# Patient Record
Sex: MALE | Race: WHITE | NOT HISPANIC OR LATINO | Employment: OTHER | URBAN - METROPOLITAN AREA
[De-identification: names, ages, dates, MRNs, and addresses within clinical notes are randomized per-mention and may not be internally consistent; named-entity substitution may affect disease eponyms.]

---

## 2023-06-22 LAB
NIL(NEG) CONTROL SPOT COUNT: NORMAL
PANEL A SPOT COUNT: 0
PANEL B SPOT COUNT: 0
POS CONTROL SPOT COUNT: NORMAL
T-SPOT. TB INTERPRETATION: NEGATIVE

## 2023-10-13 ENCOUNTER — PHARMACY VISIT (OUTPATIENT)
Dept: PHARMACY | Facility: CLINIC | Age: 58
End: 2023-10-13

## 2023-10-17 ENCOUNTER — OFFICE VISIT (OUTPATIENT)
Dept: ORTHOPEDIC SURGERY | Facility: CLINIC | Age: 58
End: 2023-10-17
Payer: COMMERCIAL

## 2023-10-17 ENCOUNTER — ANCILLARY PROCEDURE (OUTPATIENT)
Dept: RADIOLOGY | Facility: CLINIC | Age: 58
End: 2023-10-17
Payer: COMMERCIAL

## 2023-10-17 DIAGNOSIS — M25.511 RIGHT SHOULDER PAIN, UNSPECIFIED CHRONICITY: ICD-10-CM

## 2023-10-17 DIAGNOSIS — M25.511 RIGHT SHOULDER PAIN, UNSPECIFIED CHRONICITY: Primary | ICD-10-CM

## 2023-10-17 DIAGNOSIS — S46.011A TRAUMATIC TEAR OF RIGHT ROTATOR CUFF, UNSPECIFIED TEAR EXTENT, INITIAL ENCOUNTER: ICD-10-CM

## 2023-10-17 PROCEDURE — 73030 X-RAY EXAM OF SHOULDER: CPT | Mod: RT

## 2023-10-17 PROCEDURE — 2500000005 HC RX 250 GENERAL PHARMACY W/O HCPCS: Performed by: STUDENT IN AN ORGANIZED HEALTH CARE EDUCATION/TRAINING PROGRAM

## 2023-10-17 PROCEDURE — 2500000004 HC RX 250 GENERAL PHARMACY W/ HCPCS (ALT 636 FOR OP/ED): Performed by: STUDENT IN AN ORGANIZED HEALTH CARE EDUCATION/TRAINING PROGRAM

## 2023-10-17 PROCEDURE — 73030 X-RAY EXAM OF SHOULDER: CPT | Mod: RIGHT SIDE | Performed by: RADIOLOGY

## 2023-10-17 PROCEDURE — 20610 DRAIN/INJ JOINT/BURSA W/O US: CPT | Mod: RT | Performed by: STUDENT IN AN ORGANIZED HEALTH CARE EDUCATION/TRAINING PROGRAM

## 2023-10-17 PROCEDURE — 99204 OFFICE O/P NEW MOD 45 MIN: CPT | Performed by: STUDENT IN AN ORGANIZED HEALTH CARE EDUCATION/TRAINING PROGRAM

## 2023-10-17 PROCEDURE — 99214 OFFICE O/P EST MOD 30 MIN: CPT | Mod: 25 | Performed by: STUDENT IN AN ORGANIZED HEALTH CARE EDUCATION/TRAINING PROGRAM

## 2023-10-17 RX ORDER — LIDOCAINE HYDROCHLORIDE 10 MG/ML
5 INJECTION INFILTRATION; PERINEURAL
Status: COMPLETED | OUTPATIENT
Start: 2023-10-17 | End: 2023-10-17

## 2023-10-17 RX ORDER — TRIAMCINOLONE ACETONIDE 40 MG/ML
80 INJECTION, SUSPENSION INTRA-ARTICULAR; INTRAMUSCULAR
Status: COMPLETED | OUTPATIENT
Start: 2023-10-17 | End: 2023-10-17

## 2023-10-17 RX ADMIN — TRIAMCINOLONE ACETONIDE 80 MG: 40 INJECTION, SUSPENSION INTRA-ARTICULAR; INTRAMUSCULAR at 15:21

## 2023-10-17 RX ADMIN — LIDOCAINE HYDROCHLORIDE 5 ML: 10 INJECTION, SOLUTION INFILTRATION; PERINEURAL at 15:21

## 2023-10-17 NOTE — PROGRESS NOTES
CHIEF COMPLAINT: No chief complaint on file.    History: 58 y.o. male presents to the office today for evaluation of his right shoulder.  He works part-time as a teacher.  He otherwise is not too active.  He says that today he was walking his dog and fell right onto his back.  He had severe pain in the right shoulder since that time.  No prior history of right shoulder surgery.  Says the pain is primarily anterior lateral.  Having difficulty raising the arm.  Denies any numbness or tingling.  Pain is better at rest.  Rates the pain as 10 out of 10.    Past medical history, past surgical history, medications, allergies, family history, social history, and review of systems were reviewed today.    A 12 point review of systems was negative other than as stated in the HPI.    Past Medical History:   Diagnosis Date    Other specified health status     No pertinent past medical history        Not on File     Past Surgical History:   Procedure Laterality Date    OTHER SURGICAL HISTORY  08/09/2019    Hip replacement        No family history on file.     Social History     Socioeconomic History    Marital status:      Spouse name: Not on file    Number of children: Not on file    Years of education: Not on file    Highest education level: Not on file   Occupational History    Not on file   Tobacco Use    Smoking status: Not on file    Smokeless tobacco: Not on file   Substance and Sexual Activity    Alcohol use: Not on file    Drug use: Not on file    Sexual activity: Not on file   Other Topics Concern    Not on file   Social History Narrative    Not on file     Social Determinants of Health     Financial Resource Strain: Not on file   Food Insecurity: Not on file   Transportation Needs: Not on file   Physical Activity: Not on file   Stress: Not on file   Social Connections: Not on file   Intimate Partner Violence: Not on file   Housing Stability: Not on file        CURRENT MEDICATIONS:   Current Outpatient  Medications   Medication Sig Dispense Refill    risankizumab-rzaa (Skyrizi) 150 mg/mL pen injector pen INJECT 1 PEN UNDER THE SKIN ONCE EVERY 12 WEEKS AS MAINTENACE. 1 mL 3    risankizumab-rzaa (Skyrizi) 150 mg/mL pen injector pen INJECT 150MG (1 PEN) UNDER THE SKIN ONCE AT WEEK 0 AND INJECT 150MG (1 PEN) UNDER THE SKIN ONCE AT WEEK 4. 2 mL 0     No current facility-administered medications for this visit.       Physical Examination:  Well-appearing, appears stated age, pleasant and cooperative, appropriate mood and behavior. Height and weight reviewed. Alert and oriented x3.  Auditory function intact.  No acute distress.  Intact ocular function, MARY, EOMI. Breathing is unlabored . Full range of motion of the neck in flexion/extension and rotational movements. No significant areas of tenderness to palpation in the neck. There is no evidence of jugular venous distension. Skin appearance is normal without evidence of rash or other lesions. 2+ radial pulses bilaterally, fingers pink and wwp, good capillary refill, no pitting edema. No appreciable lymphadenopathy in bilateral upper extremities. SILT throughout both upper extremities, median/radial/ulnar/musculocutaneous/axillary nerve motor and sensory intact (except for abnormalities noted in focused musculoskeletal exam section below).     On exam of the bilateral upper extremities, limited range of motion of the right shoulder.  Active forward flexion 40 degrees, external rotation to neutral, internal rotation to the side.  Passively, I can assist him to forward flex to 150 degrees and externally rotate to 30 degrees.  Rotator cuff strength is 4+ out of 5, likely secondary to pain on the right side.  On the left side he has full strength rotator cuff strength testing.  His range of motion on the left is active forward flexion 150, external rotation to 30, internal rotation to T12.  Negative abdominal compression test bilaterally.    Imaging: Radiographs of the  right shoulder performed today.  Personally interpreted by myself.  Preserved glenohumeral joint space.  Preserved acromiohumeral interval.  No acute fractures noted.       Assessment: Right rotator cuff injury    Plan: Patient's symptoms, test result and exam are consistent with a diagnosis of rotator cuff injury.  On examination today, he deftly has weakness of rotator cuff strength testing, although with exertion it is near symmetric.  I do think that the exam is clouded a bit by the fact this happened this morning he still having significant pain.  We discussed the role of getting MRI to evaluate for rotator cuff tear, versus initiating conservative management.  The patient wanted to start with conservative management before getting an MRI.  And this is very reasonable.  Discussed the role of physical therapy and a cortisone injection.  Physical therapy prescription provided to the patient.  I do want to see him back in a few weeks to see how he is doing, and if he has continued weakness at that time we will get an MRI at that point.  All questions were answered.    Injection was performed, please see separate procedure note, patient tolerated well.       Patient ID: Víctor Hui is a 58 y.o. male.    L Inj/Asp: R subacromial bursa on 10/17/2023 3:21 PM  Indications: pain  Details: 22 G needle, posterior approach  Medications: 80 mg triamcinolone acetonide 40 mg/mL; 5 mL lidocaine 10 mg/mL (1 %)  Outcome: tolerated well, no immediate complications  Procedure, treatment alternatives, risks and benefits explained, specific risks discussed. Consent was given by the patient. Immediately prior to procedure a time out was called to verify the correct patient, procedure, equipment, support staff and site/side marked as required. Patient was prepped and draped in the usual sterile fashion.           Dragon software was used to dictate this note, please be aware that minor errors in transcription may be  present.    Ethan Mueller MD    Shoulder/Elbow Surgery  [facility]

## 2023-11-06 PROBLEM — E78.2 MIXED HYPERLIPIDEMIA: Status: ACTIVE | Noted: 2023-06-21

## 2023-11-06 PROBLEM — R05.3 CHRONIC COUGH: Status: ACTIVE | Noted: 2023-11-06

## 2023-11-06 PROBLEM — M16.10 PRIMARY LOCALIZED OSTEOARTHRITIS OF PELVIC REGION AND THIGH: Status: ACTIVE | Noted: 2017-07-11

## 2023-11-06 PROBLEM — M17.12 PRIMARY OSTEOARTHRITIS OF LEFT KNEE: Status: ACTIVE | Noted: 2023-11-06

## 2023-11-06 PROBLEM — R91.1 PULMONARY NODULE: Status: ACTIVE | Noted: 2023-06-21

## 2023-11-06 PROBLEM — L40.8 SEBORRHEIC PSORIASIS: Status: ACTIVE | Noted: 2023-06-21

## 2023-11-06 PROBLEM — L40.50 PSORIATIC ARTHRITIS (MULTI): Status: ACTIVE | Noted: 2023-06-21

## 2023-11-06 PROBLEM — E83.52 HYPERCALCEMIA: Status: ACTIVE | Noted: 2023-06-21

## 2023-11-06 PROBLEM — S92.353A CLOSED FRACTURE OF BASE OF FIFTH METATARSAL BONE: Status: ACTIVE | Noted: 2023-06-21

## 2023-11-06 PROBLEM — N52.9 IMPOTENCE OF ORGANIC ORIGIN: Status: ACTIVE | Noted: 2023-06-21

## 2023-11-06 PROBLEM — I10 BENIGN ESSENTIAL HYPERTENSION: Status: ACTIVE | Noted: 2023-06-21

## 2023-11-06 PROBLEM — E66.01 MORBIDLY OBESE (MULTI): Status: ACTIVE | Noted: 2017-10-26

## 2023-11-06 PROBLEM — M16.11 PRIMARY OSTEOARTHRITIS OF RIGHT HIP: Status: ACTIVE | Noted: 2017-08-28

## 2023-11-06 PROBLEM — F41.1 GAD (GENERALIZED ANXIETY DISORDER): Status: ACTIVE | Noted: 2023-06-21

## 2023-11-06 RX ORDER — KETOROLAC TROMETHAMINE 10 MG/1
10 TABLET, FILM COATED ORAL EVERY 6 HOURS PRN
COMMUNITY
Start: 2022-07-26 | End: 2023-11-27 | Stop reason: ALTCHOICE

## 2023-11-06 RX ORDER — FOLIC ACID 1 MG/1
TABLET ORAL
COMMUNITY
End: 2023-11-27 | Stop reason: ALTCHOICE

## 2023-11-06 RX ORDER — TRAMADOL HYDROCHLORIDE 50 MG/1
TABLET ORAL EVERY 6 HOURS PRN
COMMUNITY
Start: 2017-07-24 | End: 2023-11-27 | Stop reason: ALTCHOICE

## 2023-11-06 RX ORDER — NALOXONE HYDROCHLORIDE 4 MG/.1ML
SPRAY NASAL
COMMUNITY
Start: 2020-06-29 | End: 2023-11-27 | Stop reason: ALTCHOICE

## 2023-11-06 RX ORDER — ACETAMINOPHEN 500 MG
TABLET ORAL
COMMUNITY
End: 2023-11-27 | Stop reason: ALTCHOICE

## 2023-11-06 RX ORDER — DULOXETIN HYDROCHLORIDE 30 MG/1
CAPSULE, DELAYED RELEASE ORAL
COMMUNITY
Start: 2019-11-01 | End: 2023-11-27 | Stop reason: ALTCHOICE

## 2023-11-06 RX ORDER — ASPIRIN 81 MG/1
81 TABLET ORAL 2 TIMES DAILY
COMMUNITY
Start: 2020-06-29 | End: 2023-11-27 | Stop reason: ALTCHOICE

## 2023-11-06 RX ORDER — GABAPENTIN 300 MG/1
300 CAPSULE ORAL NIGHTLY
COMMUNITY
Start: 2022-12-27 | End: 2023-11-27 | Stop reason: ALTCHOICE

## 2023-11-06 RX ORDER — FLUOXETINE HYDROCHLORIDE 20 MG/1
CAPSULE ORAL
COMMUNITY
End: 2023-11-27 | Stop reason: ALTCHOICE

## 2023-11-06 RX ORDER — SERTRALINE HYDROCHLORIDE 50 MG/1
2 TABLET, FILM COATED ORAL
COMMUNITY
Start: 2022-12-13 | End: 2023-11-27 | Stop reason: ALTCHOICE

## 2023-11-06 RX ORDER — METOPROLOL TARTRATE 75 MG/1
TABLET, FILM COATED ORAL
COMMUNITY
End: 2023-11-27 | Stop reason: ALTCHOICE

## 2023-11-06 RX ORDER — METOPROLOL SUCCINATE 25 MG/1
25 TABLET, EXTENDED RELEASE ORAL EVERY MORNING
COMMUNITY

## 2023-11-06 RX ORDER — QUETIAPINE FUMARATE 25 MG/1
25 TABLET, FILM COATED ORAL NIGHTLY
COMMUNITY
End: 2023-11-27 | Stop reason: ALTCHOICE

## 2023-11-06 RX ORDER — LISINOPRIL AND HYDROCHLOROTHIAZIDE 10; 12.5 MG/1; MG/1
1 TABLET ORAL EVERY MORNING
COMMUNITY
Start: 2023-06-21 | End: 2024-07-13

## 2023-11-06 RX ORDER — APREMILAST 30 MG/1
TABLET, FILM COATED ORAL
COMMUNITY
End: 2023-11-27 | Stop reason: ALTCHOICE

## 2023-11-06 RX ORDER — FLUTICASONE PROPIONATE 50 MCG
1-2 SPRAY, SUSPENSION (ML) NASAL DAILY
COMMUNITY
Start: 2022-06-15 | End: 2023-11-27 | Stop reason: ALTCHOICE

## 2023-11-06 NOTE — PROGRESS NOTES
CHIEF COMPLAINT:   Chief Complaint   Patient presents with    Right Shoulder - Follow-up     History: 58 y.o. male presents to the office today for follow-up of his right shoulder.  We last saw him about 1 month ago.  At that time, he had a traumatic injury to the right shoulder and we were concerned about a possible rotator cuff tear.  The patient opted to try conservative management first in the form of a cortisone injection and physical therapy.  He says he injected and only helped for a few days.  Has been taking over-the-counter medications as needed for the pain.  His family member is a physical therapist and he has tried therapy, as well as physician directed exercises, but continues have significant pain and weakness of the right shoulder.  Denies any numbness or tingling.  Prior to the injury, his shoulder was functioning at 100%.  He is in 10 out of 10 pain today.    Past medical history, past surgical history, medications, allergies, family history, social history, and review of systems were reviewed today.    A 12 point review of systems was negative other than as stated in the HPI.    Past Medical History:   Diagnosis Date    Other specified health status     No pertinent past medical history        No Known Allergies     Past Surgical History:   Procedure Laterality Date    OTHER SURGICAL HISTORY  08/09/2019    Hip replacement        No family history on file.     Social History     Socioeconomic History    Marital status:      Spouse name: Not on file    Number of children: Not on file    Years of education: Not on file    Highest education level: Not on file   Occupational History    Not on file   Tobacco Use    Smoking status: Never    Smokeless tobacco: Never   Substance and Sexual Activity    Alcohol use: Not on file    Drug use: Not on file    Sexual activity: Not on file   Other Topics Concern    Not on file   Social History Narrative    Not on file     Social Determinants of Health      Financial Resource Strain: Not on file   Food Insecurity: Not on file   Transportation Needs: Not on file   Physical Activity: Not on file   Stress: Not on file   Social Connections: Not on file   Intimate Partner Violence: Not on file   Housing Stability: Not on file        CURRENT MEDICATIONS:   Current Outpatient Medications   Medication Sig Dispense Refill    apremilast (Otezla) 30 mg tablet       aspirin 81 mg EC tablet Take 1 tablet (81 mg) by mouth twice a day.      cholecalciferol (Vitamin D3) 50 mcg (2,000 unit) capsule Take by mouth.      DULoxetine (Cymbalta) 30 mg DR capsule TAKE 1 CAPSULE BY MOUTH EVERY DAY for 1 week  then take 2 capsules daily thereafter      FLUoxetine (PROzac) 20 mg capsule       fluticasone (Flonase) 50 mcg/actuation nasal spray Administer 1-2 sprays into each nostril once daily.      folic acid (Folvite) 1 mg tablet Take by mouth.      gabapentin (Neurontin) 300 mg capsule Take 1 capsule (300 mg) by mouth once daily at bedtime.      ketorolac (Toradol) 10 mg tablet Take 1 tablet (10 mg) by mouth every 6 hours if needed.      lisinopriL-hydrochlorothiazide 10-12.5 mg tablet Take 1 tablet by mouth once daily in the morning.      metoprolol succinate XL (Toprol-XL) 25 mg 24 hr tablet Take 1 tablet (25 mg) by mouth once daily in the morning.      metoprolol tartrate (Lopressor) 75 mg tablet Take by mouth.      naloxone (Narcan) 4 mg/0.1 mL nasal spray [The details of the medication are not available because there are pending changes by a home health clinician.]      QUEtiapine (SEROquel) 25 mg tablet Take 1 tablet (25 mg) by mouth once daily at bedtime.      risankizumab-rzaa (Skyrizi) 150 mg/mL pen injector pen INJECT 1 PEN UNDER THE SKIN ONCE EVERY 12 WEEKS AS MAINTENACE. 1 mL 3    risankizumab-rzaa (Skyrizi) 150 mg/mL pen injector pen INJECT 150MG (1 PEN) UNDER THE SKIN ONCE AT WEEK 0 AND INJECT 150MG (1 PEN) UNDER THE SKIN ONCE AT WEEK 4. 2 mL 0    sertraline (Zoloft) 50 mg  tablet Take 2 tablets (100 mg) by mouth once daily.      traMADol (Ultram) 50 mg tablet every 6 hours if needed.       No current facility-administered medications for this visit.       Physical Examination:      6/15/2022     8:29 AM 12/20/2022     8:24 AM 12/20/2022     8:30 AM   Vitals   Systolic 126  136   Diastolic 77  86   Heart Rate 77  68   Temp 35.8 °C (96.4 °F)  35.7 °C (96.2 °F)   Resp 16     Height (in) 1.829 m (6') 1.829 m (6')    Weight (lb) 280 274    BMI 37.97 kg/m2 37.16 kg/m2    BSA (m2) 2.54 m2 2.51 m2       There is no height or weight on file to calculate BMI.    Well-appearing, appears stated age, pleasant and cooperative, appropriate mood and behavior. Height and weight reviewed. Alert and oriented x3.  Auditory function intact.  No acute distress.  Intact ocular function, MARY, EOMI. Breathing is unlabored .  There is no evidence of jugular venous distension. Skin appearance is normal without evidence of rash or other lesions. 2+ radial pulses bilaterally, fingers pink and wwp, good capillary refill, no pitting edema. No appreciable lymphadenopathy in bilateral upper extremities. SILT throughout both upper extremities, median/radial/ulnar/musculocutaneous/axillary nerve motor and sensory intact (except for abnormalities noted in focused musculoskeletal exam section below).     Neck exam: Full range of motion of the neck in flexion/extension and rotational movements. No significant areas of tenderness to palpation in the neck.    On exam of bilateral upper extremities, very limited range of motion of the right shoulder today.  Active forward flexion to 60, external rotation of 30, internal rotation to the side.  On the left is active forward flexion to 150, external rotation 70, internal rotation T12.  Passively, I can achieve full range of motion of the right shoulder, but is extremely painful.  Significant weakness of rotator cuff strength testing on the right side, 4 out of 5 strength with  resisted supraspinatus and resisted external rotation testing, compared to full strength on the contralateral side.  Severe pain with Neer and Richardson maneuvers of the right shoulder.    Assessment: Concern for traumatic right rotator cuff tear    Plan: 58-year-old gentleman with an acute injury to the right shoulder about 4 weeks ago, now with severe pain in the shoulder as well as weakness with rotator cuff strength testing.  We have attempted conservative management in the form of cortisone injection, and therapy, but patient continues to have significant weakness.  Importantly, he has significant weakness of rotator cuff strength testing today.  I am concerned that he has a traumatic rotator cuff tear.  Therefore, due to this warrants an MRI to evaluate for the integrity of the rotator cuff.  Based on this, we will make a decision on the next plans.  MRI was ordered today.  We will see him back in a week to review this.  All questions were answered.    Dragon software was used to dictate this note, please be aware that minor errors in transcription may be present.    Ethan Mueller MD    Shoulder/Elbow Surgery  [facility]

## 2023-11-07 ENCOUNTER — OFFICE VISIT (OUTPATIENT)
Dept: ORTHOPEDIC SURGERY | Facility: CLINIC | Age: 58
End: 2023-11-07
Payer: COMMERCIAL

## 2023-11-07 ENCOUNTER — ANCILLARY PROCEDURE (OUTPATIENT)
Dept: RADIOLOGY | Facility: CLINIC | Age: 58
End: 2023-11-07
Payer: COMMERCIAL

## 2023-11-07 DIAGNOSIS — M25.511 RIGHT SHOULDER PAIN, UNSPECIFIED CHRONICITY: ICD-10-CM

## 2023-11-07 DIAGNOSIS — S46.011A TRAUMATIC TEAR OF RIGHT ROTATOR CUFF, UNSPECIFIED TEAR EXTENT, INITIAL ENCOUNTER: ICD-10-CM

## 2023-11-07 DIAGNOSIS — S46.011A TRAUMATIC TEAR OF RIGHT ROTATOR CUFF, UNSPECIFIED TEAR EXTENT, INITIAL ENCOUNTER: Primary | ICD-10-CM

## 2023-11-07 PROCEDURE — 73221 MRI JOINT UPR EXTREM W/O DYE: CPT | Mod: RT

## 2023-11-07 PROCEDURE — 99213 OFFICE O/P EST LOW 20 MIN: CPT | Performed by: STUDENT IN AN ORGANIZED HEALTH CARE EDUCATION/TRAINING PROGRAM

## 2023-11-07 PROCEDURE — 73221 MRI JOINT UPR EXTREM W/O DYE: CPT | Mod: RIGHT SIDE | Performed by: RADIOLOGY

## 2023-11-07 PROCEDURE — 1036F TOBACCO NON-USER: CPT | Performed by: STUDENT IN AN ORGANIZED HEALTH CARE EDUCATION/TRAINING PROGRAM

## 2023-11-09 ENCOUNTER — PHARMACY VISIT (OUTPATIENT)
Dept: PHARMACY | Facility: CLINIC | Age: 58
End: 2023-11-09
Payer: COMMERCIAL

## 2023-11-09 PROCEDURE — RXMED WILLOW AMBULATORY MEDICATION CHARGE

## 2023-11-13 NOTE — PROGRESS NOTES
CHIEF COMPLAINT: No chief complaint on file.    History: 58 y.o. male presents to the office today for evaluation of his right shoulder.  Just to review, patient is right-hand dominant.  He works part-time as a teacher.  He denies specific, traumatic injury while he was walking his dog and fell onto the shoulder about 1 month ago.  He says prior to this, the shoulder was functioning at 100%.  Since then, we have tried an injection and physical therapy, he has been unable to regain range of motion in the right shoulder and there is persistent pain.  Difficulty with overhead activities.  Still has significant pain in the shoulder has primarily anterior lateral.  We are concerned for a traumatic rotator cuff tear, therefore got an MRI.  Symptoms are similar to when I last saw him last week.    Past medical history, past surgical history, medications, allergies, family history, social history, and review of systems were reviewed today.    A 12 point review of systems was negative other than as stated in the HPI.    Past Medical History:   Diagnosis Date    Other specified health status     No pertinent past medical history        No Known Allergies     Past Surgical History:   Procedure Laterality Date    OTHER SURGICAL HISTORY  08/09/2019    Hip replacement        No family history on file.     Social History     Socioeconomic History    Marital status:      Spouse name: Not on file    Number of children: Not on file    Years of education: Not on file    Highest education level: Not on file   Occupational History    Not on file   Tobacco Use    Smoking status: Never    Smokeless tobacco: Never   Substance and Sexual Activity    Alcohol use: Not on file    Drug use: Not on file    Sexual activity: Not on file   Other Topics Concern    Not on file   Social History Narrative    Not on file     Social Determinants of Health     Financial Resource Strain: Not on file   Food Insecurity: Not on file   Transportation  Needs: Not on file   Physical Activity: Not on file   Stress: Not on file   Social Connections: Not on file   Intimate Partner Violence: Not on file   Housing Stability: Not on file        CURRENT MEDICATIONS:   Current Outpatient Medications   Medication Sig Dispense Refill    apremilast (Otezla) 30 mg tablet       aspirin 81 mg EC tablet Take 1 tablet (81 mg) by mouth twice a day.      cholecalciferol (Vitamin D3) 50 mcg (2,000 unit) capsule Take by mouth.      DULoxetine (Cymbalta) 30 mg DR capsule TAKE 1 CAPSULE BY MOUTH EVERY DAY for 1 week  then take 2 capsules daily thereafter      FLUoxetine (PROzac) 20 mg capsule       fluticasone (Flonase) 50 mcg/actuation nasal spray Administer 1-2 sprays into each nostril once daily.      folic acid (Folvite) 1 mg tablet Take by mouth.      gabapentin (Neurontin) 300 mg capsule Take 1 capsule (300 mg) by mouth once daily at bedtime.      ketorolac (Toradol) 10 mg tablet Take 1 tablet (10 mg) by mouth every 6 hours if needed.      lisinopriL-hydrochlorothiazide 10-12.5 mg tablet Take 1 tablet by mouth once daily in the morning.      metoprolol succinate XL (Toprol-XL) 25 mg 24 hr tablet Take 1 tablet (25 mg) by mouth once daily in the morning.      metoprolol tartrate (Lopressor) 75 mg tablet Take by mouth.      naloxone (Narcan) 4 mg/0.1 mL nasal spray [The details of the medication are not available because there are pending changes by a home health clinician.]      QUEtiapine (SEROquel) 25 mg tablet Take 1 tablet (25 mg) by mouth once daily at bedtime.      risankizumab-rzaa (Skyrizi) 150 mg/mL pen injector pen INJECT 1 PEN UNDER THE SKIN ONCE EVERY 12 WEEKS AS MAINTENACE. 1 mL 3    risankizumab-rzaa (Skyrizi) 150 mg/mL pen injector pen INJECT 150MG (1 PEN) UNDER THE SKIN ONCE AT WEEK 0 AND INJECT 150MG (1 PEN) UNDER THE SKIN ONCE AT WEEK 4. 2 mL 0    sertraline (Zoloft) 50 mg tablet Take 2 tablets (100 mg) by mouth once daily.      traMADol (Ultram) 50 mg tablet every  6 hours if needed.       No current facility-administered medications for this visit.       Physical Examination:      6/15/2022     8:29 AM 12/20/2022     8:24 AM 12/20/2022     8:30 AM   Vitals   Systolic 126  136   Diastolic 77  86   Heart Rate 77  68   Temp 35.8 °C (96.4 °F)  35.7 °C (96.2 °F)   Resp 16     Height (in) 1.829 m (6') 1.829 m (6')    Weight (lb) 280 274    BMI 37.97 kg/m2 37.16 kg/m2    BSA (m2) 2.54 m2 2.51 m2       There is no height or weight on file to calculate BMI.    Well-appearing, appears stated age, pleasant and cooperative, appropriate mood and behavior. Height and weight reviewed. Alert and oriented x3.  Auditory function intact.  No acute distress.  Intact ocular function, MARY, EOMI. Breathing is unlabored .  There is no evidence of jugular venous distension. Skin appearance is normal without evidence of rash or other lesions. 2+ radial pulses bilaterally, fingers pink and wwp, good capillary refill, no pitting edema. No appreciable lymphadenopathy in bilateral upper extremities. SILT throughout both upper extremities, median/radial/ulnar/musculocutaneous/axillary nerve motor and sensory intact (except for abnormalities noted in focused musculoskeletal exam section below).     Neck exam: Full range of motion of the neck in flexion/extension and rotational movements. No significant areas of tenderness to palpation in the neck.    Shoulder exam:    Right Shoulder  Inspection: Skin intact.   TTP: Diffuse    Range of motion  FF: Active to 60, passive to 150  ER: 30  IR behind back: Side    Strength (out of 5)  Dagoberto's: 4  Resisted ER: 4+    Special tests  Abdominal compression test: Negative  Neer's: Severe pain  Hawkin's: + Pain  ER lag: Negative    Left Shoulder  Inspection: Skin intact.   TTP: None    Range of motion (active and passive ROM was equal)  FF: 150  ER: 70  IR behind back: T12    Strength (out of 5)  Dagoberto's: 5  Resisted ER: 5    Special tests  Abdominal compression test:  Negative    Imaging: MRI of the right shoulder was reviewed today.  First interpreted by myself.  There is a full-thickness tear involving the supraspinatus and anterior infraspinatus.  The subscapularis and teres minor are intact.  There is mild tendinopathy around the biceps tendon.  There is also degenerative tearing of the anterior as well as superior labrum and biceps labral complex.  Glenohumeral joint cartilage appears intact.  There is edema within the joint is defined likely acute tear.  Rotator cuff musculature is intact without significant atrophy or fatty infiltration.    Assessment: Traumatic acute right rotator cuff tear    Plan: Long discussion with the patient again about treatment options and diagnosis.  The patient had a specific, traumatic event 1 month ago and has been having issues with his right shoulder in terms of pain and dysfunction since that time.  He has severe pain in significant rotator cuff weakness on physical exam.  MRI shows a full-thickness rotator cuff tear involving the supraspinatus and anterior infraspinatus.  There are also some degenerative changes of the shoulder.  We have tried injections as well as conservative management including physical therapy, active modification, over-the-counter medication, without success.  I discussed that in the setting of an acute, traumatic rotator cuff injury with the patient was 100% prior to the injury, we often talk about operative fixation in order to give the patient the best chance of optimizing his range of motion and restoring full function of the shoulder.  At this point the patient has failed conservative management, and would like to proceed with surgery.  I think it is very reasonable.    The patient has an acute, traumatic rotator cuff tear.  The patient has failed conservative management, including therapy and injections.  At this point, the patient is a candidate for surgery.  Patient is in agreement with this.  Risks and  recovery after surgery were discussed.  The proposed procedure will be an arthroscopic rotator cuff repair, extensive debridement, and possible biceps tenodesis depending on the state of the biceps.  Risks of surgery include bleeding, infection, failure of the tendon to heal, neurovascular injury, persistent pain, failure of the repair, and possible need for further surgery.  We discussed that there are certain risk factors for the rotator cuff tendon not healing to bone, including age over 65, large and massive rotator cuff tears, smoking, as well as others. All surgeries that are performed under anesthesia also have the risks of DVTs, pulmonary embolism, potentially death. Per anesthesia's evaluation, the patient will have a nerve block, the risks of which the anesthesia team will discuss with the patient.   Patient voiced understanding of these risks and wished to proceed.  Postoperative recovery involves being in a sling for 6 weeks without weightbearing, and then gradual therapy to strengthen the arm.  We did discuss that rotator cuff surgery has an extensive recovery, usually around 5 to 6 months until they are getting their strength back and probably feeling around 85% at that time. It is really 1 year until their arm is feeling completely better and at 100%.    The patient will need PATs which will also include a CBC, BMP, PT/INR and a full work up by the PAT team as well as anesthesia evaluation.  My office will work to get this scheduled. I will see them back 2 weeks after surgery.    Patient was prescribed a shoulder sling for postoperative care. The patient will have weakness, instability and/or deformity of their (right/left) arm which requires stabilization from this orthosis to improve their function after surgery. Verbal and written instructions for the use, wear schedule, cleaning and application of this item were given. Patient was instructed that should the brace result in increased pain,  decreased sensation, increased swelling, or an overall worsening of their medical condition, to please contact our office immediately. Orthotic management and training was provided for skin care, modifications due to healing tissues, edema changes, interruption in skin integrity, and safety precautions with the orthosis.      Dragon software was used to dictate this note, please be aware that minor errors in transcription may be present.  Ethan Mueller MD    Shoulder/Elbow Surgery  [facility]

## 2023-11-14 ENCOUNTER — PREP FOR PROCEDURE (OUTPATIENT)
Dept: ORTHOPEDIC SURGERY | Facility: CLINIC | Age: 58
End: 2023-11-14

## 2023-11-14 ENCOUNTER — OFFICE VISIT (OUTPATIENT)
Dept: ORTHOPEDIC SURGERY | Facility: CLINIC | Age: 58
End: 2023-11-14
Payer: COMMERCIAL

## 2023-11-14 DIAGNOSIS — S46.011A TRAUMATIC TEAR OF RIGHT ROTATOR CUFF, UNSPECIFIED TEAR EXTENT, INITIAL ENCOUNTER: Primary | ICD-10-CM

## 2023-11-14 PROCEDURE — 99214 OFFICE O/P EST MOD 30 MIN: CPT | Performed by: STUDENT IN AN ORGANIZED HEALTH CARE EDUCATION/TRAINING PROGRAM

## 2023-11-14 PROCEDURE — 1036F TOBACCO NON-USER: CPT | Performed by: STUDENT IN AN ORGANIZED HEALTH CARE EDUCATION/TRAINING PROGRAM

## 2023-11-14 PROCEDURE — L3670 SO ACRO/CLAV CAN WEB PRE OTS: HCPCS | Performed by: STUDENT IN AN ORGANIZED HEALTH CARE EDUCATION/TRAINING PROGRAM

## 2023-11-16 ENCOUNTER — SPECIALTY PHARMACY (OUTPATIENT)
Dept: PHARMACY | Facility: CLINIC | Age: 58
End: 2023-11-16

## 2023-11-27 ENCOUNTER — PRE-ADMISSION TESTING (OUTPATIENT)
Dept: PREADMISSION TESTING | Facility: HOSPITAL | Age: 58
End: 2023-11-27
Payer: COMMERCIAL

## 2023-11-27 VITALS
BODY MASS INDEX: 36.85 KG/M2 | OXYGEN SATURATION: 98 % | HEART RATE: 98 BPM | HEIGHT: 72 IN | DIASTOLIC BLOOD PRESSURE: 77 MMHG | RESPIRATION RATE: 18 BRPM | WEIGHT: 272.05 LBS | TEMPERATURE: 96.5 F | SYSTOLIC BLOOD PRESSURE: 126 MMHG

## 2023-11-27 DIAGNOSIS — Z01.818 PREOPERATIVE TESTING: Primary | ICD-10-CM

## 2023-11-27 LAB
ALBUMIN SERPL BCP-MCNC: 4.5 G/DL (ref 3.4–5)
ALP SERPL-CCNC: 57 U/L (ref 33–136)
ALT SERPL W P-5'-P-CCNC: 57 U/L (ref 10–52)
ANION GAP SERPL CALC-SCNC: 14 MMOL/L (ref 10–20)
AST SERPL W P-5'-P-CCNC: 49 U/L (ref 9–39)
BILIRUB SERPL-MCNC: 0.9 MG/DL (ref 0–1.2)
BUN SERPL-MCNC: 16 MG/DL (ref 6–23)
CALCIUM SERPL-MCNC: 10.1 MG/DL (ref 8.6–10.3)
CHLORIDE SERPL-SCNC: 86 MMOL/L (ref 98–107)
CO2 SERPL-SCNC: 30 MMOL/L (ref 21–32)
CREAT SERPL-MCNC: 0.97 MG/DL (ref 0.5–1.3)
ERYTHROCYTE [DISTWIDTH] IN BLOOD BY AUTOMATED COUNT: 12.9 % (ref 11.5–14.5)
GFR SERPL CREATININE-BSD FRML MDRD: 89 ML/MIN/1.73M*2
GLUCOSE SERPL-MCNC: 115 MG/DL (ref 74–99)
HCT VFR BLD AUTO: 37.9 % (ref 41–52)
HGB BLD-MCNC: 13.6 G/DL (ref 13.5–17.5)
MCH RBC QN AUTO: 36.1 PG (ref 26–34)
MCHC RBC AUTO-ENTMCNC: 35.9 G/DL (ref 32–36)
MCV RBC AUTO: 101 FL (ref 80–100)
NRBC BLD-RTO: ABNORMAL /100{WBCS}
PLATELET # BLD AUTO: 226 X10*3/UL (ref 150–450)
POTASSIUM SERPL-SCNC: 4.3 MMOL/L (ref 3.5–5.3)
PROT SERPL-MCNC: 7.3 G/DL (ref 6.4–8.2)
RBC # BLD AUTO: 3.77 X10*6/UL (ref 4.5–5.9)
SODIUM SERPL-SCNC: 126 MMOL/L (ref 136–145)
WBC # BLD AUTO: 6.7 X10*3/UL (ref 4.4–11.3)

## 2023-11-27 PROCEDURE — 93005 ELECTROCARDIOGRAM TRACING: CPT

## 2023-11-27 PROCEDURE — 99204 OFFICE O/P NEW MOD 45 MIN: CPT

## 2023-11-27 PROCEDURE — 36415 COLL VENOUS BLD VENIPUNCTURE: CPT

## 2023-11-27 PROCEDURE — 80053 COMPREHEN METABOLIC PANEL: CPT

## 2023-11-27 PROCEDURE — 85027 COMPLETE CBC AUTOMATED: CPT

## 2023-11-27 PROCEDURE — 93010 ELECTROCARDIOGRAM REPORT: CPT | Performed by: INTERNAL MEDICINE

## 2023-11-27 RX ORDER — NAPROXEN 250 MG/1
250 TABLET ORAL 2 TIMES DAILY PRN
COMMUNITY
End: 2023-11-30 | Stop reason: HOSPADM

## 2023-11-27 ASSESSMENT — CHADS2 SCORE
DIABETES: NO
CHADS2 SCORE: 1
AGE GREATER THAN OR EQUAL TO 75: NO
CHF: NO
PRIOR STROKE OR TIA OR THROMBOEMBOLISM: NO
HYPERTENSION: YES

## 2023-11-27 ASSESSMENT — DUKE ACTIVITY SCORE INDEX (DASI)
CAN YOU DO MODERATE WORK AROUND THE HOUSE LIKE VACUUMING, SWEEPING FLOORS OR CARRYING GROCERIES: YES
CAN YOU TAKE CARE OF YOURSELF (EAT, DRESS, BATHE, OR USE TOILET): YES
TOTAL_SCORE: 34.7
DASI METS SCORE: 7
CAN YOU PARTICIPATE IN STRENOUS SPORTS LIKE SWIMMING, SINGLES TENNIS, FOOTBALL, BASKETBALL, OR SKIING: NO
CAN YOU WALK A BLOCK OR TWO ON LEVEL GROUND: YES
CAN YOU DO YARD WORK LIKE RAKING LEAVES, WEEDING OR PUSHING A MOWER: YES
CAN YOU WALK INDOORS, SUCH AS AROUND YOUR HOUSE: YES
CAN YOU PARTICIPATE IN MODERATE RECREATIONAL ACTIVITIES LIKE GOLF, BOWLING, DANCING, DOUBLES TENNIS OR THROWING A BASEBALL OR FOOTBALL: YES
CAN YOU HAVE SEXUAL RELATIONS: YES
CAN YOU CLIMB A FLIGHT OF STAIRS OR WALK UP A HILL: YES
CAN YOU DO HEAVY WORK AROUND THE HOUSE LIKE SCRUBBING FLOORS OR LIFTING AND MOVING HEAVY FURNITURE: NO
CAN YOU DO LIGHT WORK AROUND THE HOUSE LIKE DUSTING OR WASHING DISHES: YES
CAN YOU RUN A SHORT DISTANCE: NO

## 2023-11-27 ASSESSMENT — PAIN SCALES - GENERAL: PAINLEVEL_OUTOF10: 4

## 2023-11-27 ASSESSMENT — PAIN DESCRIPTION - DESCRIPTORS: DESCRIPTORS: ACHING

## 2023-11-27 ASSESSMENT — PAIN - FUNCTIONAL ASSESSMENT: PAIN_FUNCTIONAL_ASSESSMENT: 0-10

## 2023-11-27 NOTE — CPM/PAT H&P
CPM/PAT Evaluation       Name: Víctor Hui (Víctor Hui)  /Age: 1965/58 y.o.     In-Person       Chief Complaint: Traumatic tear of rotator cuff-right    HPI  Patient is a 59 y/o alert and oriented male coming in for PAT for a Right shoulder arthroscopy and RCR scheduled on 2023 with Dr Mueller. He reports right shoulder pain that he rates at a 4/10 and worsens on movement/lifting. Patient denies Cx pain, SOB, CASTANEDA, and NVDC. Patient also denies Hx DVT/PE. Current medications were reviewed and a presurgical medication schedule was provided. He has no questions at this time.    NO PERSONAL REPORTS OF REACTIONS TO ANESTHESIA  NO PERSONAL REPORTS OF FAMILY HISTORY OF REACTIONS TO ANESTHESIA  NO PERSONAL REPORTS OF METAL, NICKEL, OR SHELLFISH ALLERGY  NONSMOKER-NEVER SMOKED  +ETOH REPORTS 2-3 GLASSES OF WINE A WEEK/NO DRUGS    Past Medical History:   Diagnosis Date    Anxiety     Arthritis     Hypertension     Other specified health status     No pertinent past medical history    Psoriatic arthritis (CMS/HCC)      Past Surgical History:   Procedure Laterality Date    COLONOSCOPY      HIP ARTHROPLASTY      OTHER SURGICAL HISTORY  2019    Hip replacement     No Known Allergies    Medication Documentation Review Audit       Reviewed by Ratna Nails RN (Registered Nurse) on 23 at 0718      Medication Order Taking? Sig Documenting Provider Last Dose Status      Discontinued 23 0712     Discontinued 23 0716     Discontinued 23 0713     Discontinued 23 0713      Discontinued 23 0713     Discontinued 23 0713     Discontinued 23 0713     Discontinued 23 0714     Discontinued 23 0714   lisinopriL-hydrochlorothiazide 10-12.5 mg tablet [id number] Yes Take 1 tablet by mouth once daily in the morning. Historical Provider, MD 2023 Active   metoprolol succinate XL (Toprol-XL) 25 mg 24 hr tablet [id number] Yes Take 1 tablet (25 mg) by mouth once  daily in the morning. Historical Provider, MD 11/27/2023 Active     Discontinued 11/27/23 0714     Discontinued 11/27/23 0714   naproxen (Naprosyn) 250 mg tablet [id number] Yes Take 1 tablet (250 mg) by mouth 2 times a day as needed for mild pain (1 - 3). Historical Provider, MD Past Week Active     Discontinued 11/27/23 0714   risankizumab-rzaa (Skyrizi) 150 mg/mL pen injector pen [id number]  INJECT 150 MG (1 PEN) UNDER THE SKIN ONCE EVERY 12 WEEKS AS MAINTENANCE Jona Hsu MD  Active     Discontinued 11/27/23 0714     Discontinued 11/27/23 0715     Discontinued 11/27/23 0715                  Review of Systems   Constitutional: Negative for chills, decreased appetite, diaphoresis, fever and malaise/fatigue.   Eyes:  Negative for blurred vision and double vision.   Cardiovascular:  Negative for chest pain, claudication, cyanosis, dyspnea on exertion, irregular heartbeat, leg swelling, near-syncope and palpitations.   Respiratory:  Negative for cough, hemoptysis, shortness of breath and wheezing.    Endocrine: Negative for cold intolerance, heat intolerance, polydipsia, polyphagia and polyuria.   Gastrointestinal:  Negative for abdominal pain, constipation, diarrhea, dysphagia, nausea and vomiting.   Genitourinary:  Negative for bladder incontinence, dysuria, hematuria, incomplete emptying, nocturia, pelvic pain and urgency.   Neurological:  Negative for headaches, light-headedness, paresthesias, sensory change and weakness.   Psychiatric/Behavioral:  Negative for altered mental status.       Vitals and nursing note reviewed.   Constitutional:       Appearance: Normal appearance. He is obese.   HENT:      Head: Normocephalic and atraumatic.      Mouth/Throat:      Mouth: Mucous membranes are moist.      Pharynx: Oropharynx is clear.   Eyes:      Extraocular Movements: Extraocular movements intact.      Conjunctiva/sclera: Conjunctivae normal.      Pupils: Pupils are equal, round, and reactive to light.    Cardiovascular:      Rate and Rhythm: Normal rate and regular rhythm.      Pulses: Normal pulses.      Heart sounds: Normal heart sounds.   Pulmonary:      Effort: Pulmonary effort is normal.      Breath sounds: Normal breath sounds.   Abdominal:      General: Abdomen is flat. Bowel sounds are normal.      Palpations: Abdomen is soft.   Musculoskeletal:      Cervical back: Normal range of motion and neck supple.   Skin:     General: Skin is warm and dry.      Capillary Refill: Capillary refill takes less than 2 seconds.   Neurological:      General: No focal deficit present.      Mental Status: He is alert and oriented to person, place, and time. Mental status is at baseline.   Psychiatric:         Mood and Affect: Mood normal.         Behavior: Behavior normal.         Thought Content: Thought content normal.         Judgment: Judgment normal.     PAT AIRWAY:   Airway:     Mallampati::  IIII    TM distance::  >3 FB    Neck ROM::  Full     Visit Vitals  /77   Pulse 98   Temp 35.8 °C (96.5 °F) (Temporal)   Resp 18   Ht 1.829 m (6')   Wt 123 kg (272 lb 0.8 oz)   SpO2 98%   BMI 36.90 kg/m²   Smoking Status Never   BSA 2.5 m²      EKG COMPLETED IN PAT. NSR WITH NONSPECIFIC ST AND T WAVE ABNORMALITY. ANTEROLATERAL T WAVE INVERSIONS PREVIOUSLY DEMONSTRATED ON LAST EKG 7/2022-SEE EPIC. ASYMPTOMATIC WITH GOOD FUNCTIONAL STATUS.     DASI Risk Score      Flowsheet Row Most Recent Value   DASI SCORE 34.7   METS Score (Will be calculated only when all the questions are answered) 7          Caprini DVT Assessment      Flowsheet Row Most Recent Value   DVT Score 7   Current Status Major surgery planned, including arthroscopic and laproscopic (1-2 hours)   Age 60-75 years   BMI 31-40 (Obesity)          Modified Frailty Index      Flowsheet Row Most Recent Value   Modified Frailty Index Calculator .0909          CHADS2 Stroke Risk  Current as of 7 minutes ago        N/A 3 - 100%: High Risk   2 - 3%: Medium Risk   0 - 2%:  Low Risk     Last Change: N/A          This score determines the patient's risk of having a stroke if the patient has atrial fibrillation.        This score is not applicable to this patient. Components are not calculated.          Revised Cardiac Risk Index      Flowsheet Row Most Recent Value   Revised Cardiac Risk Calculator 0          Apfel Simplified Score    No data to display       Risk Analysis Index Results This Encounter    No data found in the last 1 encounters.       Stop Bang Score      Flowsheet Row Most Recent Value   Do you snore loudly? 0   Do you often feel tired or fatigued after your sleep? 0   Has anyone ever observed you stop breathing in your sleep? 0   Do you have or are you being treated for high blood pressure? 1   Recent BMI (Calculated) 36.9   Is BMI greater than 35 kg/m2? 1=Yes   Age older than 50 years old? 1=Yes   Is your neck circumference greater than 17 inches (Male) or 16 inches (Female)? 1   Gender - Male 1=Yes   STOP-BANG Total Score 5            Assessment and Plan:     Traumatic tear of rotator cuff-right-Right shoulder arthroscopy and RCR scheduled on 11/30/2023 with Dr Mueller.     Hypertension-Managed with lisinopriL-hydrochlorothiazide 10-12.5 mg tablet and metoprolol succinate XL (Toprol-XL) 25 mg 24 hr tablet. Bp in /77    Psoriatic arthritis-Managed with risankizumab-rzaa (Skyrizi) 150 mg/mL pen injector pen. Last dose Skyrizi 10-11 weeks ago.     Obesity-BMI 36.9    Preoperative risk assessment  ASA II  RCRI-0 POINTS CLASS I RISK 3.9%  STOP-BANGS-5 POINTS HIGH RISK FOR KATERINA  NSQIP-PREDICTED LENGTH OF STAY 0 DAYS  ARISCAT-3 POINTS LOW RISK 1.6%  DASI-34.7 POINTS. 7 METS  BREN-0.1%  JHFRAT-6 POINTS MODERATE RISK FOR FALLS  CLEARANCE-NA  PAT TESTING-CBC, CMP, EKG    *CLEARED FOR SURGERY PENDING LABS/EKG. LABS REVIEWED, STABLE.    *FACE TO FACE TIME 20 MINUTES.

## 2023-11-27 NOTE — H&P (VIEW-ONLY)
CPM/PAT Evaluation       Name: Víctor Hui (Víctor Hui)  /Age: 1965/58 y.o.     In-Person       Chief Complaint: Traumatic tear of rotator cuff-right    HPI  Patient is a 57 y/o alert and oriented male coming in for PAT for a Right shoulder arthroscopy and RCR scheduled on 2023 with Dr Mueller. He reports right shoulder pain that he rates at a 4/10 and worsens on movement/lifting. Patient denies Cx pain, SOB, CASTANEDA, and NVDC. Patient also denies Hx DVT/PE. Current medications were reviewed and a presurgical medication schedule was provided. He has no questions at this time.    NO PERSONAL REPORTS OF REACTIONS TO ANESTHESIA  NO PERSONAL REPORTS OF FAMILY HISTORY OF REACTIONS TO ANESTHESIA  NO PERSONAL REPORTS OF METAL, NICKEL, OR SHELLFISH ALLERGY  NONSMOKER-NEVER SMOKED  +ETOH REPORTS 2-3 GLASSES OF WINE A WEEK/NO DRUGS    Past Medical History:   Diagnosis Date    Anxiety     Arthritis     Hypertension     Other specified health status     No pertinent past medical history    Psoriatic arthritis (CMS/HCC)      Past Surgical History:   Procedure Laterality Date    COLONOSCOPY      HIP ARTHROPLASTY      OTHER SURGICAL HISTORY  2019    Hip replacement     No Known Allergies    Medication Documentation Review Audit       Reviewed by Ratna Nails RN (Registered Nurse) on 23 at 0718      Medication Order Taking? Sig Documenting Provider Last Dose Status      Discontinued 23 0712     Discontinued 23 0716     Discontinued 23 0713     Discontinued 23 0713      Discontinued 23 0713     Discontinued 23 0713     Discontinued 23 0713     Discontinued 23 0714     Discontinued 23 0714   lisinopriL-hydrochlorothiazide 10-12.5 mg tablet [id number] Yes Take 1 tablet by mouth once daily in the morning. Historical Provider, MD 2023 Active   metoprolol succinate XL (Toprol-XL) 25 mg 24 hr tablet [id number] Yes Take 1 tablet (25 mg) by mouth once  daily in the morning. Historical Provider, MD 11/27/2023 Active     Discontinued 11/27/23 0714     Discontinued 11/27/23 0714   naproxen (Naprosyn) 250 mg tablet [id number] Yes Take 1 tablet (250 mg) by mouth 2 times a day as needed for mild pain (1 - 3). Historical Provider, MD Past Week Active     Discontinued 11/27/23 0714   risankizumab-rzaa (Skyrizi) 150 mg/mL pen injector pen [id number]  INJECT 150 MG (1 PEN) UNDER THE SKIN ONCE EVERY 12 WEEKS AS MAINTENANCE Jona Hsu MD  Active     Discontinued 11/27/23 0714     Discontinued 11/27/23 0715     Discontinued 11/27/23 0715                  Review of Systems   Constitutional: Negative for chills, decreased appetite, diaphoresis, fever and malaise/fatigue.   Eyes:  Negative for blurred vision and double vision.   Cardiovascular:  Negative for chest pain, claudication, cyanosis, dyspnea on exertion, irregular heartbeat, leg swelling, near-syncope and palpitations.   Respiratory:  Negative for cough, hemoptysis, shortness of breath and wheezing.    Endocrine: Negative for cold intolerance, heat intolerance, polydipsia, polyphagia and polyuria.   Gastrointestinal:  Negative for abdominal pain, constipation, diarrhea, dysphagia, nausea and vomiting.   Genitourinary:  Negative for bladder incontinence, dysuria, hematuria, incomplete emptying, nocturia, pelvic pain and urgency.   Neurological:  Negative for headaches, light-headedness, paresthesias, sensory change and weakness.   Psychiatric/Behavioral:  Negative for altered mental status.       Vitals and nursing note reviewed.   Constitutional:       Appearance: Normal appearance. He is obese.   HENT:      Head: Normocephalic and atraumatic.      Mouth/Throat:      Mouth: Mucous membranes are moist.      Pharynx: Oropharynx is clear.   Eyes:      Extraocular Movements: Extraocular movements intact.      Conjunctiva/sclera: Conjunctivae normal.      Pupils: Pupils are equal, round, and reactive to light.    Cardiovascular:      Rate and Rhythm: Normal rate and regular rhythm.      Pulses: Normal pulses.      Heart sounds: Normal heart sounds.   Pulmonary:      Effort: Pulmonary effort is normal.      Breath sounds: Normal breath sounds.   Abdominal:      General: Abdomen is flat. Bowel sounds are normal.      Palpations: Abdomen is soft.   Musculoskeletal:      Cervical back: Normal range of motion and neck supple.   Skin:     General: Skin is warm and dry.      Capillary Refill: Capillary refill takes less than 2 seconds.   Neurological:      General: No focal deficit present.      Mental Status: He is alert and oriented to person, place, and time. Mental status is at baseline.   Psychiatric:         Mood and Affect: Mood normal.         Behavior: Behavior normal.         Thought Content: Thought content normal.         Judgment: Judgment normal.     PAT AIRWAY:   Airway:     Mallampati::  IIII    TM distance::  >3 FB    Neck ROM::  Full     Visit Vitals  /77   Pulse 98   Temp 35.8 °C (96.5 °F) (Temporal)   Resp 18   Ht 1.829 m (6')   Wt 123 kg (272 lb 0.8 oz)   SpO2 98%   BMI 36.90 kg/m²   Smoking Status Never   BSA 2.5 m²      EKG COMPLETED IN PAT. NSR WITH NONSPECIFIC ST AND T WAVE ABNORMALITY. ANTEROLATERAL T WAVE INVERSIONS PREVIOUSLY DEMONSTRATED ON LAST EKG 7/2022-SEE EPIC. ASYMPTOMATIC WITH GOOD FUNCTIONAL STATUS.     DASI Risk Score      Flowsheet Row Most Recent Value   DASI SCORE 34.7   METS Score (Will be calculated only when all the questions are answered) 7          Caprini DVT Assessment      Flowsheet Row Most Recent Value   DVT Score 7   Current Status Major surgery planned, including arthroscopic and laproscopic (1-2 hours)   Age 60-75 years   BMI 31-40 (Obesity)          Modified Frailty Index      Flowsheet Row Most Recent Value   Modified Frailty Index Calculator .0909          CHADS2 Stroke Risk  Current as of 7 minutes ago        N/A 3 - 100%: High Risk   2 - 3%: Medium Risk   0 - 2%:  Low Risk     Last Change: N/A          This score determines the patient's risk of having a stroke if the patient has atrial fibrillation.        This score is not applicable to this patient. Components are not calculated.          Revised Cardiac Risk Index      Flowsheet Row Most Recent Value   Revised Cardiac Risk Calculator 0          Apfel Simplified Score    No data to display       Risk Analysis Index Results This Encounter    No data found in the last 1 encounters.       Stop Bang Score      Flowsheet Row Most Recent Value   Do you snore loudly? 0   Do you often feel tired or fatigued after your sleep? 0   Has anyone ever observed you stop breathing in your sleep? 0   Do you have or are you being treated for high blood pressure? 1   Recent BMI (Calculated) 36.9   Is BMI greater than 35 kg/m2? 1=Yes   Age older than 50 years old? 1=Yes   Is your neck circumference greater than 17 inches (Male) or 16 inches (Female)? 1   Gender - Male 1=Yes   STOP-BANG Total Score 5            Assessment and Plan:     Traumatic tear of rotator cuff-right-Right shoulder arthroscopy and RCR scheduled on 11/30/2023 with Dr Mueller.     Hypertension-Managed with lisinopriL-hydrochlorothiazide 10-12.5 mg tablet and metoprolol succinate XL (Toprol-XL) 25 mg 24 hr tablet. Bp in /77    Psoriatic arthritis-Managed with risankizumab-rzaa (Skyrizi) 150 mg/mL pen injector pen. Last dose Skyrizi 10-11 weeks ago.     Obesity-BMI 36.9    Preoperative risk assessment  ASA II  RCRI-0 POINTS CLASS I RISK 3.9%  STOP-BANGS-5 POINTS HIGH RISK FOR KATERINA  NSQIP-PREDICTED LENGTH OF STAY 0 DAYS  ARISCAT-3 POINTS LOW RISK 1.6%  DASI-34.7 POINTS. 7 METS  BREN-0.1%  JHFRAT-6 POINTS MODERATE RISK FOR FALLS  CLEARANCE-NA  PAT TESTING-CBC, CMP, EKG    *CLEARED FOR SURGERY PENDING LABS/EKG. LABS REVIEWED, STABLE.    *FACE TO FACE TIME 20 MINUTES.

## 2023-11-27 NOTE — PREPROCEDURE INSTRUCTIONS
Medication List            Accurate as of November 27, 2023  7:19 AM. Always use your most recent med list.                lisinopriL-hydrochlorothiazide 10-12.5 mg tablet  Medication Adjustments for Surgery: Continue until night before surgery     metoprolol succinate XL 25 mg 24 hr tablet  Commonly known as: Toprol-XL  Medication Adjustments for Surgery: Take morning of surgery with sip of water, no other fluids     naproxen 250 mg tablet  Commonly known as: Naprosyn  Medication Adjustments for Surgery: Stop 7 days before surgery     Skyrizi 150 mg/mL pen injector pen  Generic drug: risankizumab-rzaa  INJECT 150 MG (1 PEN) UNDER THE SKIN ONCE EVERY 12 WEEKS AS MAINTENANCE  Medication Adjustments for Surgery: Other (Comment)  Notes to patient: Ask prescriber                              NPO Instructions:    Do not eat any food after midnight the night before your surgery/procedure.    Additional Instructions:     Three Days before Surgery:  Review your medication instructions, stop indicated medications  The Day before Surgery:  Review your medication instructions, stop indicated medications  You will be contacted regarding the time of your arrival to facility and surgery time  Do not eat any food after Midnight  Day of Surgery:  Review your medication instructions, take indicated medications  Wear  comfortable loose fitting clothing  Do not use moisturizers, creams, lotions or perfume  All jewelry and valuables should be left at home                                                 UTI

## 2023-11-28 ENCOUNTER — APPOINTMENT (OUTPATIENT)
Dept: ORTHOPEDIC SURGERY | Facility: CLINIC | Age: 58
End: 2023-11-28
Payer: COMMERCIAL

## 2023-11-28 ENCOUNTER — ANESTHESIA EVENT (OUTPATIENT)
Dept: OPERATING ROOM | Facility: HOSPITAL | Age: 58
End: 2023-11-28
Payer: COMMERCIAL

## 2023-11-28 NOTE — DISCHARGE INSTRUCTIONS
Shoulder and Elbow Service  Ethan Mueller MD    Discharge Instructions after Arthroscopic Shoulder Repair     A sling has been provided for you. Remain in your sling at all times. This includes sleeping in your sling. *You may come out of your sling for daily exercises only.    Do not actively move your shoulder during this time. Active reaching and lifting away from the body are NOT permitted. You may use the operative arm for activities of daily living that do not require the operative arm to leave the side of the body. Such as: eating, drinking and bathing.    Remove your arm from the sling to perform elbow, wrist and hand range of motion Exercises 4-5x a day. This will help alleviate joint stiffness and swelling in your hand.   Use cryotherapy machine or ice on the shoulder intermittently over the first 72 hours up to the first 2 weeks following surgery.   Pain medication has been prescribed for you. Use your medicine liberally over the first 72 hours and only take if you are experiencing pain. You can then begin to taper your     use. You may take Extra Strength Tylenol or Tylenol only in place of the pain pills.   *DO NOT TAKE ANY nonsteroidal anti-inflammatory pain medications: Advil, Motrin, Ibuprofen, Aleve, Naproxen or Naprosyn. *UNLESS PRESCRIBED   You may remove your dressing after three days and leave open to air.   There will be sutures in place.    Do not use any aerosol deodorants or lotions on or near surgical incision(s).   You may shower 4 days after surgery. The incision(s) CANNOT get wet prior to 4 days. Simply allow the water to wash over the site and then pat dry. Do not rub the incision(s).   Take one aspirin (81 mg) daily for 2 weeks after surgery, unless you have an aspirin sensitivity or allergy, asthma or are on blood thinners.    Please call the office at [phone] for any problems. Including the following:  - Excessive redness of the incisions  - Drainage for more than 4 days  -  Fever of more than 101.5 F      Please call [phone] to make a follow-up appointment if one has not already been made      for you. You should see Dr Mueller 10-14 days after your surgical procedure.     [facility]   [facility] [facility]  [facility]  [phone]

## 2023-11-29 LAB
ATRIAL RATE: 97 BPM
P AXIS: 31 DEGREES
P OFFSET: 199 MS
P ONSET: 143 MS
PR INTERVAL: 160 MS
Q ONSET: 223 MS
QRS COUNT: 16 BEATS
QRS DURATION: 96 MS
QT INTERVAL: 316 MS
QTC CALCULATION(BAZETT): 401 MS
QTC FREDERICIA: 370 MS
R AXIS: 81 DEGREES
T AXIS: 48 DEGREES
T OFFSET: 381 MS
VENTRICULAR RATE: 97 BPM

## 2023-11-30 ENCOUNTER — HOSPITAL ENCOUNTER (OUTPATIENT)
Facility: HOSPITAL | Age: 58
Setting detail: OUTPATIENT SURGERY
Discharge: HOME | End: 2023-11-30
Attending: STUDENT IN AN ORGANIZED HEALTH CARE EDUCATION/TRAINING PROGRAM | Admitting: STUDENT IN AN ORGANIZED HEALTH CARE EDUCATION/TRAINING PROGRAM
Payer: COMMERCIAL

## 2023-11-30 ENCOUNTER — TELEPHONE (OUTPATIENT)
Dept: PRIMARY CARE | Facility: CLINIC | Age: 58
End: 2023-11-30

## 2023-11-30 ENCOUNTER — ANESTHESIA (OUTPATIENT)
Dept: OPERATING ROOM | Facility: HOSPITAL | Age: 58
End: 2023-11-30
Payer: COMMERCIAL

## 2023-11-30 VITALS
RESPIRATION RATE: 18 BRPM | BODY MASS INDEX: 36.7 KG/M2 | DIASTOLIC BLOOD PRESSURE: 91 MMHG | HEIGHT: 72 IN | SYSTOLIC BLOOD PRESSURE: 160 MMHG | TEMPERATURE: 96.8 F | WEIGHT: 270.95 LBS | HEART RATE: 66 BPM | OXYGEN SATURATION: 96 %

## 2023-11-30 DIAGNOSIS — S46.111A LABRAL TEAR OF LONG HEAD OF RIGHT BICEPS TENDON, INITIAL ENCOUNTER: ICD-10-CM

## 2023-11-30 DIAGNOSIS — S46.011D TRAUMATIC COMPLETE TEAR OF RIGHT ROTATOR CUFF, SUBSEQUENT ENCOUNTER: Primary | ICD-10-CM

## 2023-11-30 DIAGNOSIS — S46.011A TRAUMATIC TEAR OF RIGHT ROTATOR CUFF, UNSPECIFIED TEAR EXTENT, INITIAL ENCOUNTER: ICD-10-CM

## 2023-11-30 LAB
ANION GAP SERPL CALC-SCNC: 14 MMOL/L (ref 10–20)
BUN SERPL-MCNC: 14 MG/DL (ref 6–23)
CALCIUM SERPL-MCNC: 10 MG/DL (ref 8.6–10.3)
CHLORIDE SERPL-SCNC: 91 MMOL/L (ref 98–107)
CO2 SERPL-SCNC: 26 MMOL/L (ref 21–32)
CREAT SERPL-MCNC: 1.07 MG/DL (ref 0.5–1.3)
GFR SERPL CREATININE-BSD FRML MDRD: 79 ML/MIN/1.73M*2
GLUCOSE SERPL-MCNC: 109 MG/DL (ref 74–99)
POTASSIUM SERPL-SCNC: 4.3 MMOL/L (ref 3.5–5.3)
SODIUM SERPL-SCNC: 127 MMOL/L (ref 136–145)

## 2023-11-30 PROCEDURE — 76942 ECHO GUIDE FOR BIOPSY: CPT | Performed by: ANESTHESIOLOGY

## 2023-11-30 PROCEDURE — 96372 THER/PROPH/DIAG INJ SC/IM: CPT | Performed by: STUDENT IN AN ORGANIZED HEALTH CARE EDUCATION/TRAINING PROGRAM

## 2023-11-30 PROCEDURE — 7100000009 HC PHASE TWO TIME - INITIAL BASE CHARGE: Performed by: STUDENT IN AN ORGANIZED HEALTH CARE EDUCATION/TRAINING PROGRAM

## 2023-11-30 PROCEDURE — 3600000009 HC OR TIME - EACH INCREMENTAL 1 MINUTE - PROCEDURE LEVEL FOUR: Performed by: STUDENT IN AN ORGANIZED HEALTH CARE EDUCATION/TRAINING PROGRAM

## 2023-11-30 PROCEDURE — C1713 ANCHOR/SCREW BN/BN,TIS/BN: HCPCS | Performed by: STUDENT IN AN ORGANIZED HEALTH CARE EDUCATION/TRAINING PROGRAM

## 2023-11-30 PROCEDURE — 7100000001 HC RECOVERY ROOM TIME - INITIAL BASE CHARGE: Performed by: STUDENT IN AN ORGANIZED HEALTH CARE EDUCATION/TRAINING PROGRAM

## 2023-11-30 PROCEDURE — 2720000007 HC OR 272 NO HCPCS: Performed by: STUDENT IN AN ORGANIZED HEALTH CARE EDUCATION/TRAINING PROGRAM

## 2023-11-30 PROCEDURE — A29827 PR SHLDR ARTHROSCOP,SURG,W/ROTAT CUFF REPR: Performed by: ANESTHESIOLOGY

## 2023-11-30 PROCEDURE — 80048 BASIC METABOLIC PNL TOTAL CA: CPT | Performed by: ANESTHESIOLOGY

## 2023-11-30 PROCEDURE — 3700000001 HC GENERAL ANESTHESIA TIME - INITIAL BASE CHARGE: Performed by: STUDENT IN AN ORGANIZED HEALTH CARE EDUCATION/TRAINING PROGRAM

## 2023-11-30 PROCEDURE — 7100000010 HC PHASE TWO TIME - EACH INCREMENTAL 1 MINUTE: Performed by: STUDENT IN AN ORGANIZED HEALTH CARE EDUCATION/TRAINING PROGRAM

## 2023-11-30 PROCEDURE — 3600000004 HC OR TIME - INITIAL BASE CHARGE - PROCEDURE LEVEL FOUR: Performed by: STUDENT IN AN ORGANIZED HEALTH CARE EDUCATION/TRAINING PROGRAM

## 2023-11-30 PROCEDURE — 2500000004 HC RX 250 GENERAL PHARMACY W/ HCPCS (ALT 636 FOR OP/ED): Performed by: ANESTHESIOLOGIST ASSISTANT

## 2023-11-30 PROCEDURE — 7100000002 HC RECOVERY ROOM TIME - EACH INCREMENTAL 1 MINUTE: Performed by: STUDENT IN AN ORGANIZED HEALTH CARE EDUCATION/TRAINING PROGRAM

## 2023-11-30 PROCEDURE — 2500000004 HC RX 250 GENERAL PHARMACY W/ HCPCS (ALT 636 FOR OP/ED): Performed by: ANESTHESIOLOGY

## 2023-11-30 PROCEDURE — 29823 SHO ARTHRS SRG XTNSV DBRDMT: CPT | Performed by: STUDENT IN AN ORGANIZED HEALTH CARE EDUCATION/TRAINING PROGRAM

## 2023-11-30 PROCEDURE — 2500000005 HC RX 250 GENERAL PHARMACY W/O HCPCS: Performed by: ANESTHESIOLOGIST ASSISTANT

## 2023-11-30 PROCEDURE — 36415 COLL VENOUS BLD VENIPUNCTURE: CPT | Performed by: ANESTHESIOLOGY

## 2023-11-30 PROCEDURE — 2780000003 HC OR 278 NO HCPCS: Performed by: STUDENT IN AN ORGANIZED HEALTH CARE EDUCATION/TRAINING PROGRAM

## 2023-11-30 PROCEDURE — A29827 PR SHLDR ARTHROSCOP,SURG,W/ROTAT CUFF REPR: Performed by: ANESTHESIOLOGIST ASSISTANT

## 2023-11-30 PROCEDURE — 64420 NJX AA&/STRD NTRCOST NRV 1: CPT | Performed by: ANESTHESIOLOGY

## 2023-11-30 PROCEDURE — 2500000004 HC RX 250 GENERAL PHARMACY W/ HCPCS (ALT 636 FOR OP/ED): Performed by: STUDENT IN AN ORGANIZED HEALTH CARE EDUCATION/TRAINING PROGRAM

## 2023-11-30 PROCEDURE — 29828 SHO ARTHRS SRG BICP TENODSIS: CPT | Performed by: STUDENT IN AN ORGANIZED HEALTH CARE EDUCATION/TRAINING PROGRAM

## 2023-11-30 PROCEDURE — 3700000002 HC GENERAL ANESTHESIA TIME - EACH INCREMENTAL 1 MINUTE: Performed by: STUDENT IN AN ORGANIZED HEALTH CARE EDUCATION/TRAINING PROGRAM

## 2023-11-30 PROCEDURE — 29827 SHO ARTHRS SRG RT8TR CUF RPR: CPT | Performed by: STUDENT IN AN ORGANIZED HEALTH CARE EDUCATION/TRAINING PROGRAM

## 2023-11-30 DEVICE — SP FIBERTAK RC, DBLOAD TAPE BL/W, BLK/W
Type: IMPLANTABLE DEVICE | Site: SHOULDER | Status: FUNCTIONAL
Brand: ARTHREX®

## 2023-11-30 RX ORDER — HYDRALAZINE HYDROCHLORIDE 20 MG/ML
5 INJECTION INTRAMUSCULAR; INTRAVENOUS EVERY 30 MIN PRN
Status: DISCONTINUED | OUTPATIENT
Start: 2023-11-30 | End: 2023-11-30 | Stop reason: HOSPADM

## 2023-11-30 RX ORDER — PROPOFOL 10 MG/ML
INJECTION, EMULSION INTRAVENOUS AS NEEDED
Status: DISCONTINUED | OUTPATIENT
Start: 2023-11-30 | End: 2023-11-30

## 2023-11-30 RX ORDER — FENTANYL CITRATE 50 UG/ML
100 INJECTION, SOLUTION INTRAMUSCULAR; INTRAVENOUS ONCE
Status: COMPLETED | OUTPATIENT
Start: 2023-11-30 | End: 2023-11-30

## 2023-11-30 RX ORDER — LIDOCAINE HYDROCHLORIDE 10 MG/ML
INJECTION, SOLUTION EPIDURAL; INFILTRATION; INTRACAUDAL; PERINEURAL AS NEEDED
Status: DISCONTINUED | OUTPATIENT
Start: 2023-11-30 | End: 2023-11-30

## 2023-11-30 RX ORDER — LABETALOL HYDROCHLORIDE 5 MG/ML
INJECTION, SOLUTION INTRAVENOUS AS NEEDED
Status: DISCONTINUED | OUTPATIENT
Start: 2023-11-30 | End: 2023-11-30

## 2023-11-30 RX ORDER — LABETALOL HYDROCHLORIDE 5 MG/ML
5 INJECTION, SOLUTION INTRAVENOUS ONCE AS NEEDED
Status: DISCONTINUED | OUTPATIENT
Start: 2023-11-30 | End: 2023-11-30 | Stop reason: HOSPADM

## 2023-11-30 RX ORDER — SODIUM CHLORIDE, SODIUM LACTATE, POTASSIUM CHLORIDE, CALCIUM CHLORIDE 600; 310; 30; 20 MG/100ML; MG/100ML; MG/100ML; MG/100ML
100 INJECTION, SOLUTION INTRAVENOUS CONTINUOUS
Status: DISCONTINUED | OUTPATIENT
Start: 2023-11-30 | End: 2023-11-30 | Stop reason: HOSPADM

## 2023-11-30 RX ORDER — CEFAZOLIN SODIUM IN 0.9 % NACL 3 G/100 ML
3 INTRAVENOUS SOLUTION, PIGGYBACK (ML) INTRAVENOUS ONCE
Status: COMPLETED | OUTPATIENT
Start: 2023-11-30 | End: 2023-11-30

## 2023-11-30 RX ORDER — FENTANYL CITRATE 50 UG/ML
50 INJECTION, SOLUTION INTRAMUSCULAR; INTRAVENOUS EVERY 5 MIN PRN
Status: DISCONTINUED | OUTPATIENT
Start: 2023-11-30 | End: 2023-11-30 | Stop reason: HOSPADM

## 2023-11-30 RX ORDER — ONDANSETRON HYDROCHLORIDE 2 MG/ML
4 INJECTION, SOLUTION INTRAVENOUS ONCE AS NEEDED
Status: DISCONTINUED | OUTPATIENT
Start: 2023-11-30 | End: 2023-11-30 | Stop reason: HOSPADM

## 2023-11-30 RX ORDER — ASPIRIN 81 MG/1
81 TABLET ORAL DAILY
Qty: 14 TABLET | Refills: 0 | Status: SHIPPED | OUTPATIENT
Start: 2023-11-30 | End: 2023-12-14

## 2023-11-30 RX ORDER — ALBUTEROL SULFATE 0.83 MG/ML
2.5 SOLUTION RESPIRATORY (INHALATION) ONCE AS NEEDED
Status: DISCONTINUED | OUTPATIENT
Start: 2023-11-30 | End: 2023-11-30 | Stop reason: HOSPADM

## 2023-11-30 RX ORDER — MIDAZOLAM HYDROCHLORIDE 1 MG/ML
2 INJECTION, SOLUTION INTRAMUSCULAR; INTRAVENOUS ONCE
Status: COMPLETED | OUTPATIENT
Start: 2023-11-30 | End: 2023-11-30

## 2023-11-30 RX ORDER — OXYCODONE HYDROCHLORIDE 5 MG/1
5 TABLET ORAL EVERY 6 HOURS PRN
Qty: 28 TABLET | Refills: 0 | Status: SHIPPED | OUTPATIENT
Start: 2023-11-30 | End: 2023-12-07

## 2023-11-30 RX ORDER — ONDANSETRON HYDROCHLORIDE 2 MG/ML
INJECTION, SOLUTION INTRAVENOUS AS NEEDED
Status: DISCONTINUED | OUTPATIENT
Start: 2023-11-30 | End: 2023-11-30

## 2023-11-30 RX ORDER — CEFAZOLIN SODIUM 2 G/100ML
2 INJECTION, SOLUTION INTRAVENOUS ONCE
Status: DISCONTINUED | OUTPATIENT
Start: 2023-11-30 | End: 2023-11-30 | Stop reason: DRUGHIGH

## 2023-11-30 RX ORDER — DEXAMETHASONE SODIUM PHOSPHATE 4 MG/ML
INJECTION, SOLUTION INTRA-ARTICULAR; INTRALESIONAL; INTRAMUSCULAR; INTRAVENOUS; SOFT TISSUE AS NEEDED
Status: DISCONTINUED | OUTPATIENT
Start: 2023-11-30 | End: 2023-11-30

## 2023-11-30 RX ORDER — ACETAMINOPHEN 500 MG
1000 TABLET ORAL EVERY 6 HOURS PRN
Qty: 30 TABLET | Refills: 2 | Status: SHIPPED | OUTPATIENT
Start: 2023-11-30 | End: 2023-12-11

## 2023-11-30 RX ORDER — EPINEPHRINE 1 MG/ML
INJECTION, SOLUTION, CONCENTRATE INTRAVENOUS AS NEEDED
Status: DISCONTINUED | OUTPATIENT
Start: 2023-11-30 | End: 2023-11-30 | Stop reason: HOSPADM

## 2023-11-30 RX ADMIN — SODIUM CHLORIDE, POTASSIUM CHLORIDE, SODIUM LACTATE AND CALCIUM CHLORIDE: 600; 310; 30; 20 INJECTION, SOLUTION INTRAVENOUS at 10:53

## 2023-11-30 RX ADMIN — ONDANSETRON 4 MG: 2 INJECTION INTRAMUSCULAR; INTRAVENOUS at 12:36

## 2023-11-30 RX ADMIN — LABETALOL HYDROCHLORIDE 15 MG: 5 INJECTION INTRAVENOUS at 12:51

## 2023-11-30 RX ADMIN — FENTANYL CITRATE 100 MCG: 50 INJECTION INTRAMUSCULAR; INTRAVENOUS at 10:40

## 2023-11-30 RX ADMIN — LABETALOL HYDROCHLORIDE 20 MG: 5 INJECTION INTRAVENOUS at 12:36

## 2023-11-30 RX ADMIN — DEXAMETHASONE SODIUM PHOSPHATE 4 MG: 4 INJECTION, SOLUTION INTRAMUSCULAR; INTRAVENOUS at 12:36

## 2023-11-30 RX ADMIN — LIDOCAINE HYDROCHLORIDE 5 ML: 10 INJECTION, SOLUTION EPIDURAL; INFILTRATION; INTRACAUDAL; PERINEURAL at 11:03

## 2023-11-30 RX ADMIN — Medication 3 G: at 11:03

## 2023-11-30 RX ADMIN — MIDAZOLAM 2 MG: 1 INJECTION INTRAMUSCULAR; INTRAVENOUS at 10:41

## 2023-11-30 RX ADMIN — PROPOFOL 200 MG: 10 INJECTION, EMULSION INTRAVENOUS at 11:03

## 2023-11-30 RX ADMIN — SODIUM CHLORIDE: 9 INJECTION, SOLUTION INTRAVENOUS at 10:56

## 2023-11-30 RX ADMIN — LABETALOL HYDROCHLORIDE 5 MG: 5 INJECTION INTRAVENOUS at 11:25

## 2023-11-30 SDOH — HEALTH STABILITY: MENTAL HEALTH: CURRENT SMOKER: 0

## 2023-11-30 ASSESSMENT — PAIN SCALES - GENERAL
PAIN_LEVEL: 0
PAINLEVEL_OUTOF10: 3
PAINLEVEL_OUTOF10: 0 - NO PAIN
PAINLEVEL_OUTOF10: 0 - NO PAIN
PAINLEVEL_OUTOF10: 3
PAINLEVEL_OUTOF10: 6
PAINLEVEL_OUTOF10: 3
PAINLEVEL_OUTOF10: 3

## 2023-11-30 ASSESSMENT — PAIN - FUNCTIONAL ASSESSMENT
PAIN_FUNCTIONAL_ASSESSMENT: 0-10

## 2023-11-30 ASSESSMENT — COLUMBIA-SUICIDE SEVERITY RATING SCALE - C-SSRS
1. IN THE PAST MONTH, HAVE YOU WISHED YOU WERE DEAD OR WISHED YOU COULD GO TO SLEEP AND NOT WAKE UP?: NO
2. HAVE YOU ACTUALLY HAD ANY THOUGHTS OF KILLING YOURSELF?: NO
6. HAVE YOU EVER DONE ANYTHING, STARTED TO DO ANYTHING, OR PREPARED TO DO ANYTHING TO END YOUR LIFE?: NO

## 2023-11-30 NOTE — OP NOTE
Repair Arthroscopy Rotator Cuff Shoulder (R) Operative Note     Date: 2023  OR Location: [facility]    Name: Víctor Hui, : 1965, Age: 58 y.o., MRN: [id number], Sex: male    Diagnosis  Pre-op Diagnosis     * Traumatic tear of right rotator cuff, unspecified tear extent, initial encounter [S46.011A] Post-op Diagnosis     * Traumatic tear of right rotator cuff, unspecified tear extent, initial encounter [S46.011A]     Procedures  Repair Arthroscopy Rotator Cuff Shoulder  51480 - [place] SURGICAL ARTHROSCOPY SHOULDER W/ROTATOR CUFF RPR    NE SURGICAL ARTHROSCOPY SHOULDER XTNSV DBRDMT 3+ [88216]  Surgeons      * Ethan Mueller - Primary    Resident/Fellow/Other Assistant:  Surgeon(s) and Role:    Procedure Summary  Anesthesia: Consult  ASA: ASA status not filed in the log.  Anesthesia Staff: Anesthesiologist: Dontae Chun MD  C-AA: VINICIO Maurice  Estimated Blood Loss: 10 mL  Intra-op Medications: * No intraprocedure medications in log *    Specimen: No specimens collected     Staff:   No surgical staff documented.    Drains and/or Catheters: * None in log *    Tourniquet Times: 37 minutes        Implants:  Arthrex biceps button    INDICATIONS FOR PROCEDURE: The patient had significant pain and weakness in the affected extremity which was refractory to nonoperative measures and was diagnosed with a full thickness distal biceps rupture of the right elbow. Treatment options were discussed. The patient was proposed to have a right distal biceps repair. Risks and benefits of surgery and alternatives of treatment were discussed.  The patient understood all the risks and benefits and wanted to proceed with surgical option.    DESCRIPTION OF PROCEDURE: Patient was met in the preoperative holding area. Consent for surgery was reviewed and the correct operative extremity was verified and marked. The patient was then brought to the operating room and was placed in a supine position on the operating table.  SCDs  were placed for DVT prophylaxis. General anesthesia was induced. A hand table was placed, and all the bony prominences were well padded. A nonsterile tourniquet was placed proximally. The operative upper extremity was prepped and draped in usual sterile fashion.  A time out was held confirming the correct patient, operative side, operative procedure, preoperative antibiotics, implants being present and that it was safe to proceed--all were in agreement it was safe to proceed.    Attention was directed to the right upper extremity.  A longitudinal incision was made just distal to the elbow flexion crease in the midline of the forearm.  Sharp dissection was carried out through subcutaneous tissue and skin using knife and electrocautery.  Careful dissection was then taking with a Metzenbaum scissors through the fatty layer, and the fascia was identified.  The fascia was then carefully incised.  Care was taken to protect the lateral antebrachial cutaneous nerve.  The interval between brachioradialis and pronator teres was then developed bluntly.  Crossing veins were mobilized medially and laterally.  Small crossing veins that were crossing right over the surgical site were carefully cauterized using the bipolar electrocautery.  The radial tuberosity was then identified.  Care was taken to protect the PIN retractors.  Using electrocautery and blunt elevators, the radial tuberosity was cleared of all soft tissue and visualized.  At this point, attention was directed to finding the retracted distal biceps tendon.  A subcutaneous path was identified to the proximal portion of the arm.  The distal biceps tendon stump was then palpated.    It was determined that with careful release of the biceps muscle biceps tendon that the native tendon stump would be able to reach the radial tuberosity.  The biceps tendon was then whipstitched using an Arthrex fiber loop suture.  This provided excellent fixation.  The 2 ends of the suture  were then loaded onto a Arthrex cortical button.  Attention was then directed distally and the radial tuberosity was then visualized excellently.  A guidewire was placed bicortically in the midpoint of the radial tuberosity.  The tendon was sized to a size 8 and therefore a size 8 reamer was used to create a unicortical hole.  Copious irrigation was performed to remove all bony debris.  The biceps tendon button was then advanced bicortically and using alternating tension, the button was secured on the far cortex.  With gradual flexion of the arm and advancement of the tendon, the biceps tendon was then docked into the radius tunnel.  Excellent fixation was achieved.  1 limb of the suture was then passed through the biceps tendon and tied to the other limb to secure the cortical fixation.  Again, excellent fixation was achieved without sliding of the tendon.    The wound was then thoroughly irrigated again of all debris.  The wound was packed in the tourniquet was then taken down.  No significant bleeding was encountered.    Wounds were then closed with 0 Vicryl deep, 2-0 Vicryl in the subcutaneous layer and 3-0 Monocryl for the skin.  Steri-Strips, Xeroform, fluffs, web roll, posterior slab splint was placed in 90 degrees of elbow flexion.  A sling was placed.    Following the procedure, the patient was satisfactorily awakened from anesthesia and transferred to the postanesthesia care unit in stable condition.     In the postoperative care area, the patient had palpable, pulses distally in the forearm.  Demonstrated full range of motion of all fingers and was neurovascular intact.  The patient then underwent a postoperative nerve block.    ATTESTATION: Dr. Mueller was present for the entirety of the procedure and personally performed all aspects of this procedure.    Dragon software was used to dictate this note, please be aware that minor errors in transcription may be present.        Complications:  None; patient  tolerated the procedure well.    Disposition: PACU - hemodynamically stable.  Condition: stable         Additional Details: 2+ pulses postoperatively    Attending Attestation: I was present and scrubbed for the entire procedure.    Ethan Mueller  Phone Number: [phone]

## 2023-11-30 NOTE — ANESTHESIA PREPROCEDURE EVALUATION
Patient: Víctor Hui    Procedure Information       Anesthesia Start Date/Time: 11/30/23 1055    Procedure: Repair Arthroscopy Rotator Cuff Shoulder (Right: Arm Upper)    Location: [facility] 04 / [facility]    Surgeons: Ethan Mueller MD            Relevant Problems   Cardiovascular   (+) Benign essential hypertension   (+) Mixed hyperlipidemia      Endocrine   (+) Morbidly obese (CMS/HCC)      Neuro/Psych   (+) SIVA (generalized anxiety disorder)      Musculoskeletal   (+) Primary localized osteoarthritis of pelvic region and thigh   (+) Primary osteoarthritis of left knee   (+) Primary osteoarthritis of right hip      Other   (+) Psoriatic arthritis (CMS/HCC)     Past Surgical History:   Procedure Laterality Date    COLONOSCOPY      HIP ARTHROPLASTY      OTHER SURGICAL HISTORY  08/09/2019    Hip replacement       Clinical information reviewed:   Tobacco  Allergies  Meds   Med Hx  Surg Hx   Fam Hx  Soc Hx        NPO Detail:  NPO/Void Status  Date of Last Liquid: 11/29/23  Time of Last Liquid: 2000  Date of Last Solid: 11/29/23  Time of Last Solid: 2000  Last Intake Type: Clear fluids  Time of Last Void: 0800         Physical Exam    Airway  Mallampati: II  TM distance: >3 FB  Neck ROM: full  Comments: Thick necked.   Cardiovascular   Comments: deferred   Dental    Pulmonary   Comments: deferred   Abdominal     Comments: deferred       Repeat sodium level higher     Anesthesia Plan    ASA 2     general and regional     The patient is not a current smoker.    intravenous induction   Postoperative administration of opioids is intended.  Anesthetic plan and risks discussed with patient.  Use of blood products discussed with patient who.    Plan discussed with CAA.

## 2023-11-30 NOTE — ANESTHESIA PROCEDURE NOTES
Airway  Date/Time: 11/30/2023 11:04 AM  Urgency: elective      Staffing  Performed: VINICIO   Authorized by: Dontae Chun MD    Performed by: VINICIO Maurice  Patient location during procedure: OR    Indications and Patient Condition  Indications for airway management: anesthesia  Spontaneous Ventilation: absent  Sedation level: deep  Preoxygenated: yes  Mask difficulty assessment: 1 - vent by mask    Final Airway Details  Final airway type: supraglottic airway      Successful airway: Size 4     Number of attempts at approach: 1

## 2023-11-30 NOTE — OP NOTE
Repair Arthroscopy Rotator Cuff Shoulder (R) Operative Note     Date: 2023  OR Location: [facility]    Name: Víctor Hui, : 1965, Age: 58 y.o., MRN: [id number], Sex: male    Diagnosis  Pre-op Diagnosis     * Traumatic tear of right rotator cuff, unspecified tear extent, initial encounter [S46.011A] Post-op Diagnosis     * Traumatic tear of right rotator cuff, unspecified tear extent, initial encounter [S46.011A]     * Labral tear of long head of right biceps tendon, initial encounter [S46.111A]     Procedures  Repair Arthroscopy Rotator Cuff Shoulder  12909 - [place] SURGICAL ARTHROSCOPY SHOULDER W/ROTATOR CUFF RPR    [place] SURGICAL ARTHROSCOPY SHOULDER XTNSV DBRDMT 3+ [51114]  [place] SURGICAL ARTHROSCOPY SHOULDER BICEPS TENODESIS [61608]  Surgeons      * Ethan Mueller - Primary    Resident/Fellow/Other Assistant:  Surgeon(s) and Role:    Procedure Summary  Anesthesia: Consult  ASA: ASA status not filed in the log.  Anesthesia Staff: Anesthesiologist: Dontae Chun MD  C-AA: VINICIO Maurice  Estimated Blood Loss: 10 mL  Intra-op Medications:   Medication Name Total Dose   EPINEPHrine HCl (PF) (Adrenalin) injection 4 mg   lactated Ringer's infusion 1.12 mL   ceFAZolin in 0.9% sod chloride (Ancef) IVPB 3 g 3 g   fentaNYL PF (Sublimaze) injection 100 mcg 100 mcg   midazolam (Versed) injection 2 mg 2 mg              Anesthesia Record               Intraprocedure I/O Totals          Intake    NaCl 0.9 % 1000.00 mL    Propofol Drip 0.00 mL    The total shown is the total volume documented since Anesthesia Start was filed.    Total Intake 1000 mL          Specimen: No specimens collected     Staff:   Circulator: Kaylin Richard RN  Relief Circulator: Joceline Navarro RN; Lindy Boothe RN  Scrub Person: Veda Rodrigues PA-C; Kim Meza; Cielo Diana         Drains and/or Catheters: * None in log *    Tourniquet Times:         Implants:  Implants       Type Name Action Serial No.      Implant ANCHOR,  FIBERTAK, W/ TWO 1.3MM SUTURE TAPE, (WH/BL/ & WH/BLK - [id number] Implanted      Implant ANCHOR, FIBERTAK, W/ TWO 1.3MM SUTURE TAPE, (WH/BL/ & /BLK - [id number] Implanted               INDICATIONS FOR PROCEDURE: The patient had significant pain and weakness in the affected extremity which was refractory to nonoperative measures and was diagnosed with rotator cuff tear of the right shoulder. Treatment options were discussed. The patient was proposed to have a shoulder arthroscopy and related procedures based on preoperative planning and intraoperative findings. Risks and benefits of surgery and alternatives of treatment were discussed.  The patient understood all the risks and benefits and wanted to proceed with surgical option.    DESCRIPTION OF PROCEDURE: Patient was met in the preoperative holding area. Consent for surgery was reviewed and the correct operative extremity was verified and marked. The patient then underwent a preoperative  nerve block, please see anesthesia records regarding this. The patient was then brought to the operating room and was placed in a supine position on the operating table.  SCDs were placed for DVT prophylaxis. General anesthesia was induced. The patient was placed in a beach chair position to around 90 degrees of inclination, and all the bony prominences were well padded. The operative upper extremity was prepped and draped in usual sterile fashion.  A time out was held confirming the correct patient, operative side, operative procedure, preoperative antibiotics, implants being present and that it was safe to proceed--all were in agreement it was safe to proceed.    Exam under anesthesia: Prior to incision, an EUA showed: Full passive range of motion    Diagnostic shoulder arthroscopy of the glenohumeral joint: A standard posterolateral arthroscopic portal was made approximately 2 cm medial and inferior to the posterolateral border of the acromion, at the soft spot of the  glenohumeral joint. The arthroscope was introduced into the glenohumeral joint. Under arthroscopic visualization, an anterior portal was made in the rotator interval. This was made by first visualizing the trajectory with a spinal needle and then utilizing a cannula from outside to inside to create this anterior portal along a similar path in the same trajectory through a small skin incision.    Diagnostic arthroscopy revealed:   Biceps tendon: Significant tearing and fraying  Subscapularis: Intact  Anterior capsule: Synovitis  Glenoid cartilage: Intact  Humeral head cartilage: Area on the anterior humeral head with full-thickness cartilage loss  Anterior labrum: Degenerative fraying  Superior labrum: Degenerative fraying  Posterior labrum: Intact  Axillary recess: No loose bodies  Inferior glenohumeral ligaments: Intact  Posterosuperior rotator cuff: Tear of the supraspinatus and anterior infraspinatus    A debridement was then performed on anterior capsule, humeral head cartilage, anterior labrum, superior labrum and biceps anchor complex    For the state of the biceps tendon, decision was made to perform a biceps tenodesis.  The biceps tendon was secured using a fiber loop suture as well as his second security stitch.  The biceps was then cut from its attachment and the superior labrum.  The biceps stump was debrided.  The suture from the biceps tendon was incorporated into the lateral row fixation.    Subacromial bursectomy: Next, the arthroscope was introduced into the subacromial space. There was extensive synovitis and bursitis in this region with associated scarring. An extensive bursectomy and synovectomy was performed in the subacromial space using a combination of 4.5 mm shaver and the RF device through a lateral portal.     Rotator cuff repair: Attention was then turned to repair of the rotator cuff tears. There was a large tear of the supraspinatus and infraspinatus tendons. In order to fully mobilize  the tendons, soft tissue releases were performed anteriorly, superiorly, and inferiorly, essentially circumferentially around the tendons. This allowed for the tendon to be pulled over to the greater tuberosity. Next, two medial row double loaded Arthrex all suture anchors with SutureTape were placed, one anterior and one posterior at the articular margin. Utilizing the Scorpion suture passer and SutureTape grasper, suture strands were passed through the rotator cuff tissue from anterior to posterior in positions to reduce the torn cuff in a horizontal mattress configuration. Suture pairs were then tied using an arthroscopic knot pusher. This secured the medial row fixation. Next, 1 tail of each suture knot was brought to a lateral row Swivel Lock anchor placed anteriorly in the proximal humerus.  We did incorporate the biceps suture into the anterior lateral row anchor achieving biceps tenodesis.  This process was repeated for the remaining suture tails and a second lateral row Swivel Lock anchor was placed posteriorly. This completed the lateral row fixation. We then inspected our repair and felt that we had adequately repaired the rotator cuff onto its footprint on the greater tuberosity and that the rotator cuff was not under excessive tension in this location.    Closure of incisions:  After completion of a satisfactory debridement, the shoulder subacromial space was once again swept to remove any remaining loose bone fragments, synovitis, and potential scar tissue. Arthroscopic fluid was evacuated from the subacromial space joint and instruments were removed.    The portals were reapproximated and closed with 3-0 nylon suture. We confirmed that all counts were correct at the end of the case prior to and following closure. A clean sterile dressing consisting of xeroform, fluffs, ABD pad, and foam tape was applied. The drapes were then taken down. The patient was then placed into a sling with an abduction pillow  prior to being awoken from anesthesia.    Following the procedure, the patient was satisfactorily awakened from anesthesia and transferred to the postanesthesia care unit in stable condition.     ATTESTATION: Dr. Mueller was present for the entirety of the procedure and personally performed all aspects of this procedure.    Dragon software was used to dictate this note, please be aware that minor errors in transcription may be present.    Ethan Mueller MD    Shoulder/Elbow Surgery  [facility]      Complications:  None; patient tolerated the procedure well.    Disposition: PACU - hemodynamically stable.  Condition: stable     Attending Attestation: I was present and scrubbed for the entire procedure.    Ethan Mueller  Phone Number: [phone]

## 2023-11-30 NOTE — ANESTHESIA PROCEDURE NOTES
Peripheral Block    Patient location during procedure: pre-op  Start time: 11/30/2023 10:45 AM  End time: 11/30/2023 10:47 AM  Reason for block: at surgeon's request and post-op pain management  Staffing  Performed: attending   Authorized by: Dontae Chun MD    Performed by: Dontae Chun MD  Preanesthetic Checklist  Completed: patient identified, IV checked, site marked, risks and benefits discussed, surgical consent, monitors and equipment checked, pre-op evaluation and timeout performed   Timeout performed at: 11/30/2023 10:40 AM  Peripheral Block  Patient position: laying flat  Prep: alcohol swabs  Patient monitoring: heart rate, cardiac monitor and continuous pulse ox  Block type: interscalene  Laterality: right  Injection technique: single-shot  Guidance: nerve stimulator and ultrasound guided  Local infiltration: bupivicaine  Infiltration strength: 0.5 %  Dose: 20 mL  Needle  Needle type: short-bevel   Needle gauge: 22 G  Needle length: 5 cm  Needle localization: anatomical landmarks, ultrasound guidance and nerve stimulator  Needle insertion depth: 4 cm  Assessment  Injection assessment: negative aspiration for heme, no paresthesia on injection, incremental injection and local visualized surrounding nerve on ultrasound  Paresthesia pain: none  Heart rate change: no  Slow fractionated injection: yes  Additional Notes  Dexamethasone 10 mg added to local

## 2023-11-30 NOTE — ANESTHESIA POSTPROCEDURE EVALUATION
Patient: Víctor Hui    Procedure Summary       Date: 11/30/23 Room / Location: [facility] / [facility]    Anesthesia Start: 1055 Anesthesia Stop: 1251    Procedure: Repair Arthroscopy Rotator Cuff Shoulder (Right: Arm Upper) Diagnosis:       Traumatic tear of right rotator cuff, unspecified tear extent, initial encounter      Labral tear of long head of right biceps tendon, initial encounter      (Traumatic tear of right rotator cuff, unspecified tear extent, initial encounter [S46.011A])    Surgeons: Ethan Mueller MD Responsible Provider: Dontae Chun MD    Anesthesia Type: general, regional ASA Status: 2            Anesthesia Type: No value filed.    Vitals Value Taken Time   /90 11/30/23 1330   Temp 36.5 °C (97.7 °F) 11/30/23 1245   Pulse 72 11/30/23 1330   Resp 16 11/30/23 1330   SpO2 96 % 11/30/23 1330       Anesthesia Post Evaluation    Patient location during evaluation: PACU  Patient participation: complete - patient participated  Level of consciousness: awake and alert  Pain score: 0  Pain management: satisfactory to patient  Airway patency: patent  Cardiovascular status: hemodynamically stable  Respiratory status: acceptable  Hydration status: acceptable  Postoperative Nausea and Vomiting: none  Comments: PONV absent        There were no known notable events for this encounter.

## 2023-12-06 ASSESSMENT — PAIN SCALES - GENERAL: PAINLEVEL_OUTOF10: 5 - MODERATE PAIN

## 2023-12-11 ENCOUNTER — SPECIALTY PHARMACY (OUTPATIENT)
Dept: PHARMACY | Facility: CLINIC | Age: 58
End: 2023-12-11

## 2023-12-11 NOTE — PROGRESS NOTES
History: Patient returns to the office status post R RCR and BT on 2 weeks ago. Patient has been immobilized in a sling and pain is well controlled. Denies numbness/tingling.     Past medical history, past surgical history, medications, allergies, family history, social history, and review of systems were reviewed today and have been documented separately in this encounter.   A 12 point review of systems was negative other than as stated in the HPI.    Physical Examination:    On exam of the right upper extremity, incisions are well healed, without significant erythema or drainage, sutures were removed today. Demonstrates full ROM of the elbow/wrist/hand. Neurovascularly intact throughout.    Assessment: 2 weeks s/p R RCR and BT     Plan: At this point we will continue sling wear to allow the shoulder to heal. We will see them back in 3-4 weeks and begin PT at that time. All questions answered.     Dragon software was used to dictate this note, please be aware that minor errors in transcription may be present.    Ethan Mueller MD    Shoulder/Elbow Surgery  [facility]

## 2023-12-12 ENCOUNTER — OFFICE VISIT (OUTPATIENT)
Dept: ORTHOPEDIC SURGERY | Facility: CLINIC | Age: 58
End: 2023-12-12
Payer: COMMERCIAL

## 2023-12-12 DIAGNOSIS — S46.011A TRAUMATIC TEAR OF RIGHT ROTATOR CUFF, UNSPECIFIED TEAR EXTENT, INITIAL ENCOUNTER: Primary | ICD-10-CM

## 2023-12-12 PROCEDURE — 1036F TOBACCO NON-USER: CPT | Performed by: STUDENT IN AN ORGANIZED HEALTH CARE EDUCATION/TRAINING PROGRAM

## 2023-12-12 PROCEDURE — 99024 POSTOP FOLLOW-UP VISIT: CPT | Performed by: STUDENT IN AN ORGANIZED HEALTH CARE EDUCATION/TRAINING PROGRAM

## 2023-12-13 NOTE — PROGRESS NOTES
[facility] Clinical Note    Víctor Hui is a 58 y.o. male, who is on the specialty pharmacy service for management of: Dermatology Core with status of: (Enrolled)     Víctor was contacted on 12/13/2023.    Refer to the encounter summary report for documentation details about patient counseling and education.      Medication Adherence  The importance of adherence was discussed with the patient and they were advised to take the medication as prescribed by their provider. Víctor was encouraged to call his physician's office if they have a question regarding a missed dose.        Patient advised to contact the pharmacy if there are any changes to her medication list, including prescriptions, OTC medications, herbal products, or supplements. Patient was advised of [facility]’s dispensing process, refill timeline, contact information ([phone]), and patient management follow up. Patient confirmed understanding of education conducted during assessment. All patient questions and concerns were addressed to the best of my ability. Patient was encouraged to contact the specialty pharmacy with any questions or concerns.    Confirmed follow-up outreaches are properly scheduled. Reviewed goals of therapy in the program targets.    Elvin Frank, PharmD

## 2023-12-26 ENCOUNTER — OFFICE VISIT (OUTPATIENT)
Dept: ORTHOPEDIC SURGERY | Facility: CLINIC | Age: 58
End: 2023-12-26
Payer: COMMERCIAL

## 2023-12-26 DIAGNOSIS — S46.011A TRAUMATIC TEAR OF RIGHT ROTATOR CUFF, UNSPECIFIED TEAR EXTENT, INITIAL ENCOUNTER: Primary | ICD-10-CM

## 2023-12-26 PROCEDURE — 1036F TOBACCO NON-USER: CPT | Performed by: STUDENT IN AN ORGANIZED HEALTH CARE EDUCATION/TRAINING PROGRAM

## 2023-12-26 PROCEDURE — 99024 POSTOP FOLLOW-UP VISIT: CPT | Performed by: STUDENT IN AN ORGANIZED HEALTH CARE EDUCATION/TRAINING PROGRAM

## 2023-12-26 NOTE — PROGRESS NOTES
History: Patient returns to the office status post R RCR and BT on 4 weeks ago. Patient has been immobilized in a sling and pain is well controlled. Denies numbness/tingling.     Past medical history, past surgical history, medications, allergies, family history, social history, and review of systems were reviewed today and have been documented separately in this encounter.   A 12 point review of systems was negative other than as stated in the HPI.    Physical Examination:    On exam of the right upper extremity, incisions are well healed, without significant erythema or drainage. Demonstrates full ROM of the elbow/wrist/hand. Neurovascularly intact throughout. Passive ROM of the operative shoulder , ER 40.     Assessment: 4 weeks s/p R RCR and BT     Plan: At this point we will discontinue sling wear and start formal PT. PT script provided. Will progress from PROM to AAROM to AROM as tolerated, strengthening after ROM is full. We will see them back around 3 months postoperatively. All questions answered.     Dragon software was used to dictate this note, please be aware that minor errors in transcription may be present.    Ethan Mueller MD    Shoulder/Elbow Surgery  [facility]

## 2024-01-03 ENCOUNTER — EVALUATION (OUTPATIENT)
Dept: PHYSICAL THERAPY | Facility: CLINIC | Age: 59
End: 2024-01-03
Payer: COMMERCIAL

## 2024-01-03 ENCOUNTER — APPOINTMENT (OUTPATIENT)
Dept: PHYSICAL THERAPY | Facility: CLINIC | Age: 59
End: 2024-01-03
Payer: COMMERCIAL

## 2024-01-03 DIAGNOSIS — S46.011A TRAUMATIC TEAR OF RIGHT ROTATOR CUFF, UNSPECIFIED TEAR EXTENT, INITIAL ENCOUNTER: ICD-10-CM

## 2024-01-03 DIAGNOSIS — M25.611 DECREASED RANGE OF MOTION OF RIGHT SHOULDER: ICD-10-CM

## 2024-01-03 DIAGNOSIS — M25.511 RIGHT SHOULDER PAIN, UNSPECIFIED CHRONICITY: Primary | ICD-10-CM

## 2024-01-03 DIAGNOSIS — M62.81 MUSCLE WEAKNESS OF RIGHT UPPER EXTREMITY: ICD-10-CM

## 2024-01-03 PROCEDURE — 97140 MANUAL THERAPY 1/> REGIONS: CPT | Mod: GP | Performed by: PHYSICAL THERAPIST

## 2024-01-03 PROCEDURE — 97110 THERAPEUTIC EXERCISES: CPT | Mod: GP | Performed by: PHYSICAL THERAPIST

## 2024-01-03 PROCEDURE — 97161 PT EVAL LOW COMPLEX 20 MIN: CPT | Mod: GP | Performed by: PHYSICAL THERAPIST

## 2024-01-03 ASSESSMENT — PAIN - FUNCTIONAL ASSESSMENT: PAIN_FUNCTIONAL_ASSESSMENT: 0-10

## 2024-01-03 ASSESSMENT — ENCOUNTER SYMPTOMS
LOSS OF SENSATION IN FEET: 0
DEPRESSION: 0
OCCASIONAL FEELINGS OF UNSTEADINESS: 0

## 2024-01-03 ASSESSMENT — PATIENT HEALTH QUESTIONNAIRE - PHQ9
2. FEELING DOWN, DEPRESSED OR HOPELESS: NOT AT ALL
1. LITTLE INTEREST OR PLEASURE IN DOING THINGS: NOT AT ALL
SUM OF ALL RESPONSES TO PHQ9 QUESTIONS 1 AND 2: 0

## 2024-01-03 ASSESSMENT — PAIN SCALES - GENERAL: PAINLEVEL_OUTOF10: 4

## 2024-01-03 NOTE — PROGRESS NOTES
Physical Therapy Evaluation and Treatment      Patient Name: Víctor Hui  MRN: [id number]  Today's Date: 1/3/2024  Visit #1  Time Calculation  Start Time: 0935  Stop Time: 1010  Time Calculation (min): 35 min    Insurance:  Reviewed    Assessment:  Patient is presenting today s/p R RCR with supraspinatus and infraspinatus involvement as well as biceps tenodesis on 11/30/23. Examination reveals below impairments. Patient will benefit from physical therapy services to improve listed impairments. Initiated treatment today to address these impairments.    Patient with the following impairments: decreased muscle performance, decreased ROM, decreased activity tolerance, pain, participation restrictions, and difficulty with ADL completion    Patient's response to session: Decreased pain, Increased ROM/joint mobility, Increase motor control, and Increased knowledge and understanding    Plan:  Treatment/Interventions: Cryotherapy, Education/ Instruction, Electrical stimulation, Dry needling, Hot pack, Manual therapy, Neuromuscular re-education, Self care/ home management, Taping techniques, Therapeutic activities, Therapeutic exercises, Vasopneumatic device  PT Plan: Skilled PT  PT Frequency: 2 times per week  Duration: 12 weeks  Onset Date: 11/30/23  Rehab Potential: Good  Plan of Care Agreement: Patient    Current Problem:   1. Right shoulder pain, unspecified chronicity  Follow Up In Physical Therapy      2. Muscle weakness of right upper extremity  Follow Up In Physical Therapy      3. Decreased range of motion of right shoulder  Follow Up In Physical Therapy      4. Traumatic tear of right rotator cuff, unspecified tear extent, initial encounter  Referral to Physical Therapy          Subjective   Patient presenting today s/p R RCR with supraspinatus and infraspinatus involvement as well as biceps tenodesis on 11/30/23. Overall he is doing fairly well. Pain is being managed with aspirin. He reports he has not been able  to sleep well lately. Patient denies numbness/tingling. Patient wishes to make a full recovery.    Pain Assessment: 0-10  Pain Score: 4    General  Referred By: Dr. Ami Maloney  STEADI Fall Risk Score (The score of 4 or more indicates an increased risk of falling): 2  Precautions Comment: No ER past 40, no strengthening, PROM>AAROM>AROM as tolerated    Objective   Shoulder PROM (L/R)  Flexion: 170°/90°  Abduction: 170°/NT°  Extension: 60°/NT°  External Rotation: 90°/25  Internal rotation: 70°/NT°    Shoulder MMT (L/R) - deferred today    Joint mobility testing (normal unless otherwise noted below)  C0-1:  C1-3:  C3-6:   C7-T5: hypo    Scapular position: protracted    TTP and increased tone pec minor, RTC, periscapulars    Dermatomes intact BUE    Outcome Measures:  Other Measures  Disability of Arm Shoulder Hand (DASH): 72.5     Treatments:  Therapeutic Exercise (76068): 1 unit(s)  HEP review  Wand ER*  Table slides*  Seated scap retractions*    Manual Therapy (99600): 1 unit(s)  PROM flexion  PROM ER    Neuromuscular Re-education (62687): 0 unit(s)  Not performed    Education and discussion on HEP and treatment regarding the benefits related to current condition, POC, pathophysiology, and precautions    *added to HEP    Goals:  Patient will improve quickDASH score to meet minimal detectable change of improvement to improve performance of ADLs.    Patient will be independent with home exercise program for proper self-management of condition.    Patient will improve active range of motion in deficit areas for ADL completion.    Patient will improve strength in deficit areas so patient can work with less pain.    Patient will do housework without pain.

## 2024-01-08 ENCOUNTER — TREATMENT (OUTPATIENT)
Dept: PHYSICAL THERAPY | Facility: CLINIC | Age: 59
End: 2024-01-08
Payer: COMMERCIAL

## 2024-01-08 DIAGNOSIS — M25.611 DECREASED RANGE OF MOTION OF RIGHT SHOULDER: Primary | ICD-10-CM

## 2024-01-08 DIAGNOSIS — M62.81 MUSCLE WEAKNESS OF RIGHT UPPER EXTREMITY: ICD-10-CM

## 2024-01-08 DIAGNOSIS — M25.511 RIGHT SHOULDER PAIN, UNSPECIFIED CHRONICITY: ICD-10-CM

## 2024-01-08 PROCEDURE — 97110 THERAPEUTIC EXERCISES: CPT | Mod: GP

## 2024-01-08 PROCEDURE — 97140 MANUAL THERAPY 1/> REGIONS: CPT | Mod: GP

## 2024-01-08 ASSESSMENT — PAIN SCALES - GENERAL: PAINLEVEL_OUTOF10: 6

## 2024-01-08 ASSESSMENT — PAIN - FUNCTIONAL ASSESSMENT: PAIN_FUNCTIONAL_ASSESSMENT: 0-10

## 2024-01-08 NOTE — PROGRESS NOTES
Physical Therapy Treatment      Patient Name: Víctor Hui  MRN: [id number]  Today's Date: 1/8/2024  Visit #2 of 24  Time Calculation  Start Time: 1503  Stop Time: 1546  Time Calculation (min): 43 min    Insurance:  Visit Limit: 30 per year    Assessment:  Pt had moderate guarding with PROM that improved throughout his session. Relief reported with gentle GHJ mobs and oscillation for pain modulation. Reviewed previous HEP and added bent over pendulums with body mov't. Discussed using more ice at home in the evening to assist with pain relief.    Patient's response to session: Increased knowledge and understanding    Plan:  Continue per POC.    Current Problem:   1. Decreased range of motion of right shoulder        2. Muscle weakness of right upper extremity        3. Right shoulder pain, unspecified chronicity            Subjective   Pt is 5 weeks and 4 days post-op R RCR with supraspinatus and infraspinatus involvement as well as biceps tenodesis on 11/30/23     Pt reports pain is still pretty good and continues to have trouble sleeping. Pt has been trying to sleep in his bed    Pain Assessment: 0-10  Pain Score: 6    Precautions  STEADI Fall Risk Score (The score of 4 or more indicates an increased risk of falling): 2  Precautions Comment: No ER past 40, no strengthening, PROM>AAROM>AROM as tolerated    Objective   PROM abd ~90deg    Treatments:  Therapeutic Exercise (70239): (8 min)  HEP review  Bent-over pendulum with body mov't  Seated table slides scaption x10  Seated scapular retraction x15  Standing elbow flexion x10     Manual Therapy (57789): (35 min)  PROM: abduction, flexion  Gentle GHJ AP mobs grade I-II and oscillations for pain relief  STM R pec major/ minor, subscap    Neuromuscular Re-education (12987): (0 min)  Not performed today     Education and discussion on HEP and treatment regarding the benefits related to current condition, POC, pathophysiology, and precautions    Education/ HEP:  Cont with  previous HEP given at eval

## 2024-01-15 ENCOUNTER — TREATMENT (OUTPATIENT)
Dept: PHYSICAL THERAPY | Facility: CLINIC | Age: 59
End: 2024-01-15
Payer: COMMERCIAL

## 2024-01-15 DIAGNOSIS — M25.511 RIGHT SHOULDER PAIN, UNSPECIFIED CHRONICITY: ICD-10-CM

## 2024-01-15 DIAGNOSIS — M62.81 MUSCLE WEAKNESS OF RIGHT UPPER EXTREMITY: ICD-10-CM

## 2024-01-15 DIAGNOSIS — M25.611 DECREASED RANGE OF MOTION OF RIGHT SHOULDER: Primary | ICD-10-CM

## 2024-01-15 PROCEDURE — 97140 MANUAL THERAPY 1/> REGIONS: CPT | Mod: GP

## 2024-01-15 PROCEDURE — 97110 THERAPEUTIC EXERCISES: CPT | Mod: GP

## 2024-01-15 ASSESSMENT — PAIN SCALES - GENERAL: PAINLEVEL_OUTOF10: 6

## 2024-01-15 ASSESSMENT — PAIN - FUNCTIONAL ASSESSMENT: PAIN_FUNCTIONAL_ASSESSMENT: 0-10

## 2024-01-15 NOTE — PROGRESS NOTES
Physical Therapy Treatment      Patient Name: Víctor Hui  MRN: [id number]  Today's Date: 1/15/2024  Visit #3 of 24  Time Calculation  Start Time: 1348  Stop Time: 1431  Time Calculation (min): 43 min    Insurance:  Visit Limit: 30 per year    Assessment:  Pt demonstrated improved PROM from previous session with decreased guarding. Introduced pulleys in pain free range. Pt was challenged with gentle isometrics for ER/IR for muscle activation. Isometrics were not added to HEP d/t discomfort in-session. Pt is progressing well with skilled PT.     Patient's response to session: Increased knowledge and understanding    Plan:  Continue per POC.    Current Problem:   1. Decreased range of motion of right shoulder        2. Muscle weakness of right upper extremity        3. Right shoulder pain, unspecified chronicity              Subjective   Pt is 6 weeks and 4 days post-op R RCR with supraspinatus and infraspinatus involvement as well as biceps tenodesis on 11/30/23     Pt reports having a rough day today. No sling upon entrance.     Pain Assessment: 0-10  Pain Score: 6    Precautions  STEADI Fall Risk Score (The score of 4 or more indicates an increased risk of falling): 2  Precautions Comment: No ER past 40, no strengthening, PROM>AAROM>AROM as tolerated    Objective   PROM flexion ~110, abd ~105    Treatments:  Therapeutic Exercise (10311): (10 min)  HEP review  Pulley facing x12  Bent-over low row x10  Not performed today  Bent-over pendulum with body mov't  Seated table slides scaption x10  Seated scapular retraction x15  Standing elbow flexion x10     Manual Therapy (46989): (33 min)  PROM: abduction, flexion, ER  Gentle GHJ AP mobs grade I-II, II-III and oscillations for pain relief  GHJ distraction with abduction   STM R pec major/ minor, subscap  S/L scapular PROM     Neuromuscular Re-education (43077): (0 min)  Not performed today     Education and discussion on HEP and treatment regarding the benefits related  to current condition, POC, pathophysiology, and precautions    Education/ HEP:  Cont with previous HEP given at eval

## 2024-01-19 DIAGNOSIS — T14.8XXA SCRATCH MARK: Primary | ICD-10-CM

## 2024-01-19 RX ORDER — MUPIROCIN 20 MG/G
OINTMENT TOPICAL
Qty: 22 G | Refills: 1 | Status: SHIPPED | OUTPATIENT
Start: 2024-01-19

## 2024-01-22 ENCOUNTER — TREATMENT (OUTPATIENT)
Dept: PHYSICAL THERAPY | Facility: CLINIC | Age: 59
End: 2024-01-22
Payer: COMMERCIAL

## 2024-01-22 DIAGNOSIS — M25.611 DECREASED RANGE OF MOTION OF RIGHT SHOULDER: Primary | ICD-10-CM

## 2024-01-22 DIAGNOSIS — M62.81 MUSCLE WEAKNESS OF RIGHT UPPER EXTREMITY: ICD-10-CM

## 2024-01-22 DIAGNOSIS — M25.511 RIGHT SHOULDER PAIN, UNSPECIFIED CHRONICITY: ICD-10-CM

## 2024-01-22 PROCEDURE — 97140 MANUAL THERAPY 1/> REGIONS: CPT | Mod: GP

## 2024-01-22 PROCEDURE — 97110 THERAPEUTIC EXERCISES: CPT | Mod: GP

## 2024-01-22 ASSESSMENT — PAIN SCALES - GENERAL: PAINLEVEL_OUTOF10: 3

## 2024-01-22 ASSESSMENT — PAIN - FUNCTIONAL ASSESSMENT: PAIN_FUNCTIONAL_ASSESSMENT: 0-10

## 2024-01-22 NOTE — PROGRESS NOTES
Physical Therapy Treatment      Patient Name: Víctor Hui  MRN: [id number]  Today's Date: 1/22/2024  Visit #4 of 24  Time Calculation  Start Time: 1341  Stop Time: 1430  Time Calculation (min): 49 min    Insurance:  Visit Limit: 30 per year    Assessment:  Pt presented with reduced pain upon arrival. Has tenderness/ spasm in R posterior and superior musculature. Progressed therex with moderate difficulty. CGA with supine serratus punch and small boxes. Pt fatigued quickly with isometrics, but was able to tolerate with 25% or less effort. Pt was given updated HEP.     Patient's response to session: Increased knowledge and understanding    Plan:  Continue per POC.    Current Problem:   1. Decreased range of motion of right shoulder        2. Muscle weakness of right upper extremity        3. Right shoulder pain, unspecified chronicity            Subjective   Pt is 7 weeks and 4 days post-op R RCR with supraspinatus and infraspinatus involvement as well as biceps tenodesis on 11/30/23     Pt reports he feels like his shoulder is improving. Sleep is still hit or miss    Pain Assessment: 0-10  Pain Score: 3    Precautions  STEADI Fall Risk Score (The score of 4 or more indicates an increased risk of falling): 2  Precautions Comment: No ER past 40, no strengthening, PROM>AAROM>AROM as tolerated    Objective   Flexion with pulley 125  TTP R UT, infrapsinatus, teres major/ minor    Treatments:  Therapeutic Exercise (41354): (20 min)  HEP review  Supine Serratus punch 2x10  Supine boxes cw/ccw x10  Pulley facing x15  Iso flexion/ ER/ IR/ ext 5sec x10  Table slides 2x15 flexion  Table slides x15 scaption  Supine wand flexion in pain free range x5  Not performed today  Bent-over low row x10  Bent-over pendulum with body mov't  Seated table slides scaption x10  Seated scapular retraction x15  Standing elbow flexion x10     Manual Therapy (07622): (29 min)  PROM: abduction, flexion, ER  S/L scapular PROM   Gentle GHJ AP mobs  grade I-II, II-III and oscillations for pain relief  GHJ distraction with abduction   STM R pec major/ minor, subscap      Neuromuscular Re-education (61973): (0 min)  Not performed today     Education and discussion on HEP and treatment regarding the benefits related to current condition, POC, pathophysiology, and precautions    Education/ HEP:  Access Code: [email or web address]  URL: [email or web address]  Date: 01/22/2024  Prepared by: Thi Ortiz    Exercises  - Isometric Shoulder Flexion at Wall  - 1 x daily - 3-4 x weekly - 1-2 sets - 10 reps - 5 hold  - Standing Isometric Shoulder Internal Rotation at Doorway  - 1 x daily - 3-4 x weekly - 1-2 sets - 10 reps - 5 hold  - Isometric Shoulder Extension at Wall  - 1 x daily - 3-4 x weekly - 1-2 sets - 10 reps - 5 hold  - Standing Isometric Shoulder External Rotation with Doorway  - 1 x daily - 3-4 x weekly - 1-2 sets - 10 reps - 5 hold  - Seated Shoulder Flexion Towel Slide at Table Top  - 1 x daily - 3-4 x weekly - 1-2 sets - 10 reps  - Seated Shoulder Abduction Towel Slide at Table Top  - 1 x daily - 3-4 x weekly - 1-2 sets - 10 reps  - Supine Shoulder Flexion Extension AAROM with Dowel  - 1 x daily - 3-4 x weekly - 1-2 sets - 10 reps  - Seated Scapular Retraction  - 1 x daily - 3-4 x weekly - 1-2 sets - 10 reps

## 2024-01-24 ENCOUNTER — SPECIALTY PHARMACY (OUTPATIENT)
Dept: PHARMACY | Facility: CLINIC | Age: 59
End: 2024-01-24

## 2024-01-29 ENCOUNTER — TREATMENT (OUTPATIENT)
Dept: PHYSICAL THERAPY | Facility: CLINIC | Age: 59
End: 2024-01-29
Payer: COMMERCIAL

## 2024-01-29 DIAGNOSIS — M25.511 RIGHT SHOULDER PAIN, UNSPECIFIED CHRONICITY: ICD-10-CM

## 2024-01-29 DIAGNOSIS — M62.81 MUSCLE WEAKNESS OF RIGHT UPPER EXTREMITY: ICD-10-CM

## 2024-01-29 DIAGNOSIS — M25.611 DECREASED RANGE OF MOTION OF RIGHT SHOULDER: Primary | ICD-10-CM

## 2024-01-29 PROCEDURE — 97110 THERAPEUTIC EXERCISES: CPT | Mod: GP

## 2024-01-29 PROCEDURE — 97140 MANUAL THERAPY 1/> REGIONS: CPT | Mod: GP

## 2024-01-29 ASSESSMENT — PAIN SCALES - GENERAL: PAINLEVEL_OUTOF10: 3

## 2024-01-29 ASSESSMENT — PAIN - FUNCTIONAL ASSESSMENT: PAIN_FUNCTIONAL_ASSESSMENT: 0-10

## 2024-01-29 NOTE — PROGRESS NOTES
Physical Therapy Treatment      Patient Name: Víctor Hui  MRN: [id number]  Today's Date: 1/29/2024  Visit #5 of 24  Time Calculation  Start Time: 1348  Stop Time: 1427  Time Calculation (min): 39 min    Insurance:  Visit Limit: 30 per year    Assessment:  Pt demonstrated improved PROM with flex and abduction without familiar pain. Able to complete wand therex without pain- fatigue reported. Good serratus and mid trap contraction palpated with s/l resisted scapular retraction. Fatigue reported at end of session. Educated pt on use of pulleys for home use. Pt is progressing well with skilled PT.     Patient's response to session: Increased knowledge and understanding    Plan:  Continue per POC.    Current Problem:   1. Decreased range of motion of right shoulder        2. Muscle weakness of right upper extremity        3. Right shoulder pain, unspecified chronicity              Subjective   Pt is 8 weeks and 4 days post-op R RCR with supraspinatus and infraspinatus involvement as well as biceps tenodesis on 11/30/23     Pt repots he feels like his arm has been moving further. Still is having trouble sleeping.     Pain Assessment: 0-10  Pain Score: 3    Precautions  STEADI Fall Risk Score (The score of 4 or more indicates an increased risk of falling): 2  Precautions Comment: No ER past 40, no strengthening, PROM>AAROM>AROM as tolerated    Objective   PROM flexion ~130deg  PROM abd ~130    Treatments:  Therapeutic Exercise (33708): (18 min)  HEP review  Wand flexion 2x10  Supine boxes  wand cw/ccw 2x10ea  Bent-over extension to side x10  Bent-over mid row 2x10  Standing SB AP and ML x30sec ea with gentle compression   SB IR iso with biceps curl 2x10  Wall ladder x4  Not performed   Supine Serratus punch 2x10  Pulley facing x15  Iso flexion/ ER/ IR/ ext 5sec x10  Table slides 2x15 flexion    Manual Therapy (08125): (21 min)  PROM: abduction, flexion, ER  S/L scapular PROM   Gentle GHJ AP mobs grade I-II, II-III and  oscillations for pain relief  GHJ distraction with abduction   S/L gentle manual resistance with scapular retraction and elevation x5 ea     Neuromuscular Re-education (52968): (0 min)  Not performed today     Education and discussion on HEP and treatment regarding the benefits related to current condition, POC, pathophysiology, and precautions    Education/ HEP:  Access Code: [email or web address]  URL: [email or web address]  Date: 01/22/2024  Prepared by: Thi Ortiz    Exercises  - Isometric Shoulder Flexion at Wall  - 1 x daily - 3-4 x weekly - 1-2 sets - 10 reps - 5 hold  - Standing Isometric Shoulder Internal Rotation at Doorway  - 1 x daily - 3-4 x weekly - 1-2 sets - 10 reps - 5 hold  - Isometric Shoulder Extension at Wall  - 1 x daily - 3-4 x weekly - 1-2 sets - 10 reps - 5 hold  - Standing Isometric Shoulder External Rotation with Doorway  - 1 x daily - 3-4 x weekly - 1-2 sets - 10 reps - 5 hold  - Seated Shoulder Flexion Towel Slide at Table Top  - 1 x daily - 3-4 x weekly - 1-2 sets - 10 reps  - Seated Shoulder Abduction Towel Slide at Table Top  - 1 x daily - 3-4 x weekly - 1-2 sets - 10 reps  - Supine Shoulder Flexion Extension AAROM with Dowel  - 1 x daily - 3-4 x weekly - 1-2 sets - 10 reps  - Seated Scapular Retraction  - 1 x daily - 3-4 x weekly - 1-2 sets - 10 reps

## 2024-02-07 ENCOUNTER — TREATMENT (OUTPATIENT)
Dept: PHYSICAL THERAPY | Facility: CLINIC | Age: 59
End: 2024-02-07
Payer: COMMERCIAL

## 2024-02-07 DIAGNOSIS — M62.81 MUSCLE WEAKNESS OF RIGHT UPPER EXTREMITY: ICD-10-CM

## 2024-02-07 DIAGNOSIS — M25.611 DECREASED RANGE OF MOTION OF RIGHT SHOULDER: Primary | ICD-10-CM

## 2024-02-07 DIAGNOSIS — M25.511 RIGHT SHOULDER PAIN, UNSPECIFIED CHRONICITY: ICD-10-CM

## 2024-02-07 PROCEDURE — 97140 MANUAL THERAPY 1/> REGIONS: CPT | Mod: GP

## 2024-02-07 PROCEDURE — 97110 THERAPEUTIC EXERCISES: CPT | Mod: GP

## 2024-02-07 ASSESSMENT — PAIN SCALES - GENERAL: PAINLEVEL_OUTOF10: 3

## 2024-02-07 ASSESSMENT — PAIN - FUNCTIONAL ASSESSMENT: PAIN_FUNCTIONAL_ASSESSMENT: 0-10

## 2024-02-07 NOTE — PROGRESS NOTES
Physical Therapy Treatment      Patient Name: Víctor Hui  MRN: [id number]  Today's Date: 2/7/2024  Visit #6 of 24  Time Calculation  Start Time: 0933  Stop Time: 1016  Time Calculation (min): 43 min    Insurance:  Visit Limit: 30 per year    Assessment:  Pt presented with increased discomfort upon arrival with PROM and palpation of pecs/ UT, mid trap/ rhomboid and deltoids. Improved flexion throughout session after manual and with wand. Pt to have follow-up with ortho next week. Pt will continue to benefit from skilled PT to address impairments and progress per protocol.     Patient's response to session: Increased knowledge and understanding    Plan:  Continue per POC.    Current Problem:   1. Decreased range of motion of right shoulder        2. Muscle weakness of right upper extremity        3. Right shoulder pain, unspecified chronicity            Subjective   Pt is 9 weeks and 6 days post-op R RCR with supraspinatus and infraspinatus involvement as well as biceps tenodesis on 11/30/23     Pt reports feeling like he has more mobility, but still has nagging ache in UT. Pt has follow-up with Dr. Mueller next week.     Pain Assessment: 0-10  Pain Score: 3    Precautions  STEADI Fall Risk Score (The score of 4 or more indicates an increased risk of falling): 2  Precautions Comment: No ER past 40, no strengthening, PROM>AAROM>AROM as tolerated    Objective   TTP RUT, pec major/ minor   Flexion with wand 140deg    Treatments:  Therapeutic Exercise (64160): (22 min)  HEP review  Pulley 2'  Wand flexion 2x10  Supine boxes  wand cw/ccw 1x10ea  Supine ER with wand 2x10  Gentle perturbations with arm at side in static ER [id number]  Bent-over mid row 2x10  Bent-over extension to side x10  Standing SB flexion 2x10  Not performed today   Standing SB AP and ML x30sec ea with gentle compression   SB IR iso with biceps curl 2x10  Wall ladder x4  Supine Serratus punch 2x10  Iso flexion/ ER/ IR/ ext 5sec x10  Table slides 2x15  flexion    Manual Therapy (94649): (21 min)  PROM: abduction, flexion, ER  Gentle GHJ AP mobs grade I-II, II-III and oscillations for pain relief  GHJ distraction with abduction   Not performed today   S/L scapular PROM     Neuromuscular Re-education (36302): (0 min)  Not performed today     Education and discussion on HEP and treatment regarding the benefits related to current condition, POC, pathophysiology, and precautions    Education/ HEP:  Access Code: [email or web address]  URL: [email or web address]  Date: 01/22/2024  Prepared by: Thi Ortiz    Exercises  - Isometric Shoulder Flexion at Wall  - 1 x daily - 3-4 x weekly - 1-2 sets - 10 reps - 5 hold  - Standing Isometric Shoulder Internal Rotation at Doorway  - 1 x daily - 3-4 x weekly - 1-2 sets - 10 reps - 5 hold  - Isometric Shoulder Extension at Wall  - 1 x daily - 3-4 x weekly - 1-2 sets - 10 reps - 5 hold  - Standing Isometric Shoulder External Rotation with Doorway  - 1 x daily - 3-4 x weekly - 1-2 sets - 10 reps - 5 hold  - Seated Shoulder Flexion Towel Slide at Table Top  - 1 x daily - 3-4 x weekly - 1-2 sets - 10 reps  - Seated Shoulder Abduction Towel Slide at Table Top  - 1 x daily - 3-4 x weekly - 1-2 sets - 10 reps  - Supine Shoulder Flexion Extension AAROM with Dowel  - 1 x daily - 3-4 x weekly - 1-2 sets - 10 reps  - Seated Scapular Retraction  - 1 x daily - 3-4 x weekly - 1-2 sets - 10 reps

## 2024-02-13 ENCOUNTER — OFFICE VISIT (OUTPATIENT)
Dept: ORTHOPEDIC SURGERY | Facility: CLINIC | Age: 59
End: 2024-02-13
Payer: COMMERCIAL

## 2024-02-13 DIAGNOSIS — S46.011A TRAUMATIC TEAR OF RIGHT ROTATOR CUFF, UNSPECIFIED TEAR EXTENT, INITIAL ENCOUNTER: Primary | ICD-10-CM

## 2024-02-13 PROCEDURE — 99024 POSTOP FOLLOW-UP VISIT: CPT | Performed by: STUDENT IN AN ORGANIZED HEALTH CARE EDUCATION/TRAINING PROGRAM

## 2024-02-13 PROCEDURE — 1036F TOBACCO NON-USER: CPT | Performed by: STUDENT IN AN ORGANIZED HEALTH CARE EDUCATION/TRAINING PROGRAM

## 2024-02-13 NOTE — PROGRESS NOTES
History: Patient returns to the office status post R RCR and BT on 2.5 months ago. Patient has been working with therapy and pain is well controlled. Denies numbness/tingling.     Past medical history, past surgical history, medications, allergies, family history, social history, and review of systems were reviewed today and have been documented separately in this encounter.   A 12 point review of systems was negative other than as stated in the HPI.    Physical Examination:    On exam of the right upper extremity, incisions are well healed, without significant erythema or drainage. Demonstrates full ROM of the elbow/wrist/hand. Neurovascularly intact throughout. AROM of the shoulder today to , ER 40.     Assessment: 2.5 months s/p R RCR and BT     Plan: Patient progressing as expected after recent surgery. Continue to work with PT. We will see them back around 6 months postoperatively. All questions answered.     Dragon software was used to dictate this note, please be aware that minor errors in transcription may be present.    Ethan Mueller MD    Shoulder/Elbow Surgery  [facility]

## 2024-02-14 ENCOUNTER — TREATMENT (OUTPATIENT)
Dept: PHYSICAL THERAPY | Facility: CLINIC | Age: 59
End: 2024-02-14
Payer: COMMERCIAL

## 2024-02-14 DIAGNOSIS — M62.81 MUSCLE WEAKNESS OF RIGHT UPPER EXTREMITY: ICD-10-CM

## 2024-02-14 DIAGNOSIS — M25.511 RIGHT SHOULDER PAIN, UNSPECIFIED CHRONICITY: ICD-10-CM

## 2024-02-14 DIAGNOSIS — M25.611 DECREASED RANGE OF MOTION OF RIGHT SHOULDER: Primary | ICD-10-CM

## 2024-02-14 PROCEDURE — 97140 MANUAL THERAPY 1/> REGIONS: CPT | Mod: GP

## 2024-02-14 PROCEDURE — 97110 THERAPEUTIC EXERCISES: CPT | Mod: GP

## 2024-02-14 ASSESSMENT — PAIN - FUNCTIONAL ASSESSMENT: PAIN_FUNCTIONAL_ASSESSMENT: 0-10

## 2024-02-14 ASSESSMENT — PAIN SCALES - GENERAL: PAINLEVEL_OUTOF10: 2

## 2024-02-14 NOTE — PROGRESS NOTES
Physical Therapy Treatment      Patient Name: Víctor Hui  MRN: [id number]  Today's Date: 2/14/2024  Visit #7 of 24  Time Calculation  Start Time: 0930  Stop Time: 1016  Time Calculation (min): 46 min    Insurance:  Visit Limit: 30 per year    Assessment:  Pt presented with improved pain levels and ROM upon entrance.  Able to progress therex with min-mod difficulty. Fatigued quickly with IR. Pt's PROM is improving, but pt is challenged with strength and endurance needed to AROM and functional tasks. Pt will continue to benefit from skilled PT to address impairments.     Patient's response to session: Increased knowledge and understanding    Plan:  Continue per POC.    Current Problem:   1. Decreased range of motion of right shoulder        2. Muscle weakness of right upper extremity        3. Right shoulder pain, unspecified chronicity              Subjective   Pt is 10 weeks and 6 days post-op R RCR with supraspinatus and infraspinatus involvement as well as biceps tenodesis on 11/30/23     Pt reports doing well, but continue to have pain with sleeping. Pt saw the MD yesterday and he is please with his progress.      Pain Assessment: 0-10  Pain Score: 2    Precautions  STEADI Fall Risk Score (The score of 4 or more indicates an increased risk of falling): 2    Objective   TTP RUT, pec major/ minor   Flexion 135deg    Treatments:  Therapeutic Exercise (03939): (25 min)  HEP review  UBE 3'  Wall ladder flexion x4, scaption x2  Mid Row YTB x20  Standing IR/ ER YTB 2x10  Pertubations in supine with arm extended [id number]  Flexion small PNF 2x10 45deg to 110deg  Not performed today   Pulley 2'  Wand flexion 2x10  Supine boxes  wand cw/ccw 1x10ea  Supine ER with wand 2x10  Gentle perturbations with arm at side in static ER [id number]  Bent-over mid row 2x10  Bent-over extension to side x10  Standing SB flexion 2x10  Standing SB AP and ML x30sec ea with gentle compression   Supine Serratus punch 2x10  Iso flexion/ ER/ IR/ ext  5sec x10  Table slides 2x15 flexion    Manual Therapy (35519): (21 min)  PROM: abduction, flexion  S/L scapular PROM   STM/ TrPR along infraspinatus, mid trap,   Gentle GHJ AP mobs grade I-II, II-III and oscillations for pain relief  GHJ distraction with abduction   Not performed today       Neuromuscular Re-education (34778): (0 min)  Not performed today     Education and discussion on HEP and treatment regarding the benefits related to current condition, POC, pathophysiology, and precautions    Education/ HEP:  Access Code: [email or web address]  URL: [email or web address]  Date: 02/14/2024  Prepared by: Thi Ortiz    Exercises  - Supine Shoulder Flexion Extension AAROM with Dowel  - 1 x daily - 3-4 x weekly - 1-2 sets - 10 reps  - Seated Scapular Retraction  - 1 x daily - 3-4 x weekly - 1-2 sets - 10 reps  - Standing Shoulder Row with Anchored Resistance  - 1 x daily - 3-4 x weekly - 2 sets - 10 reps  - Shoulder External Rotation with Anchored Resistance  - 1 x daily - 3-4 x weekly - 2 sets - 10 reps  - Shoulder Internal Rotation with Resistance  - 1 x daily - 3-4 x weekly - 2 sets - 10 reps  - Sidelying Shoulder External Rotation Dumbbell  - 1 x daily - 3-4 x weekly - 2 sets - 10 reps

## 2024-02-20 ENCOUNTER — PHARMACY VISIT (OUTPATIENT)
Dept: PHARMACY | Facility: CLINIC | Age: 59
End: 2024-02-20
Payer: COMMERCIAL

## 2024-02-20 PROCEDURE — RXMED WILLOW AMBULATORY MEDICATION CHARGE

## 2024-02-21 ENCOUNTER — TREATMENT (OUTPATIENT)
Dept: PHYSICAL THERAPY | Facility: CLINIC | Age: 59
End: 2024-02-21
Payer: COMMERCIAL

## 2024-02-21 DIAGNOSIS — M25.611 DECREASED RANGE OF MOTION OF RIGHT SHOULDER: Primary | ICD-10-CM

## 2024-02-21 DIAGNOSIS — M25.511 RIGHT SHOULDER PAIN, UNSPECIFIED CHRONICITY: ICD-10-CM

## 2024-02-21 DIAGNOSIS — M62.81 MUSCLE WEAKNESS OF RIGHT UPPER EXTREMITY: ICD-10-CM

## 2024-02-21 PROCEDURE — 97140 MANUAL THERAPY 1/> REGIONS: CPT | Mod: GP

## 2024-02-21 PROCEDURE — 97110 THERAPEUTIC EXERCISES: CPT | Mod: GP

## 2024-02-21 ASSESSMENT — PAIN SCALES - GENERAL: PAINLEVEL_OUTOF10: 3

## 2024-02-21 ASSESSMENT — PAIN - FUNCTIONAL ASSESSMENT: PAIN_FUNCTIONAL_ASSESSMENT: 0-10

## 2024-02-21 NOTE — PROGRESS NOTES
Physical Therapy Treatment      Patient Name: Víctor Hui  MRN: [id number]  Today's Date: 2/21/2024  Visit #8 of 24  Time Calculation  Start Time: 0930  Stop Time: 1013  Time Calculation (min): 43 min    Insurance:  Visit Limit: 30 per year    Assessment:  Pt presented with increased general pain d/t not having his medication for psoriatic arthritis. Able to tolerate manual with minimal discomfort into flexion. Changed ER/IR therabands to walkouts for improved comfort- good mechanics observed. Pt will continue to benefit from skilled PT to address impairments and improve ROM and strength.     Patient's response to session: Increased knowledge and understanding    Plan:  Continue per POC.    Current Problem:   1. Decreased range of motion of right shoulder        2. Muscle weakness of right upper extremity        3. Right shoulder pain, unspecified chronicity            Subjective   Pt is 11 weeks and 6 days post-op R RCR with supraspinatus and infraspinatus involvement as well as biceps tenodesis on 11/30/23     Pt reports feeling increased soreness upon arrival. Has Pt has not been able to get his medicine for this psoriatic arthritis and is experience increase body discomfort.     Pain Assessment: 0-10  Pain Score: 3    Precautions  STEADI Fall Risk Score (The score of 4 or more indicates an increased risk of falling): 2    Objective   TTP RUT, pec major/ minor   Flexion 115deg    Treatments:  Therapeutic Exercise (55413): (33 min)  HEP review  UBE 4'  Pulley 1'  Wall ladder flexion x4  Resisted IR from ER (45deg abd) x10  Wand flexion 2x10   Supine ABCs x1  Supine serratus punch x20  Mid Row YTB x20  Standing IR/ ER walkouts YTB x20  Ball on wall up/down and in/out x10ea    Not performed today   Pertubations in supine with arm extended [id number]  Flexion small PNF 2x10 45deg to 110deg  Supine ER with wand 2x10  Gentle perturbations with arm at side in static ER [id number]  Bent-over mid row 2x10  Bent-over extension  to side x10  Standing SB flexion 2x10  Standing SB AP and ML x30sec ea with gentle compression   Iso flexion/ ER/ IR/ ext 5sec x10  Table slides 2x15 flexion    Manual Therapy (58337): (10 min)  PROM: abduction, flexion, ER  Gentle GHJ AP mobs grade I-II, II-III and oscillations for pain relief    Not performed today   S/L scapular PROM   STM/ TrPR along infraspinatus, mid trap,   GHJ distraction with abduction         Neuromuscular Re-education (30812): (0 min)  Not performed today     Education and discussion on HEP and treatment regarding the benefits related to current condition, POC, pathophysiology, and precautions    Education/ HEP:  Access Code: [email or web address]  URL: [email or web address]  Date: 02/21/2024  Prepared by: Thi Ortiz    Exercises  - Supine Shoulder Flexion Extension AAROM with Dowel  - 1 x daily - 3-4 x weekly - 1-2 sets - 10 reps  - Seated Scapular Retraction  - 1 x daily - 3-4 x weekly - 1-2 sets - 10 reps  - Standing Shoulder Row with Anchored Resistance  - 1 x daily - 3-4 x weekly - 2 sets - 10 reps  - Sidelying Shoulder External Rotation Dumbbell  - 1 x daily - 3-4 x weekly - 2 sets - 10 reps  - Shoulder Internal Rotation Reactive Isometrics  - 1 x daily - 3-4 x weekly - 2 sets - 10 reps  - Shoulder External Rotation Reactive Isometrics  - 1 x daily - 3-4 x weekly - 2 sets - 10 reps  - Standing Wall Ball Circles with Mini Swiss Ball  - 1 x daily - 3-4 x weekly - 2 sets - 10 reps

## 2024-02-28 ENCOUNTER — TREATMENT (OUTPATIENT)
Dept: PHYSICAL THERAPY | Facility: CLINIC | Age: 59
End: 2024-02-28
Payer: COMMERCIAL

## 2024-02-28 DIAGNOSIS — M25.511 RIGHT SHOULDER PAIN, UNSPECIFIED CHRONICITY: ICD-10-CM

## 2024-02-28 DIAGNOSIS — M25.611 DECREASED RANGE OF MOTION OF RIGHT SHOULDER: Primary | ICD-10-CM

## 2024-02-28 DIAGNOSIS — M62.81 MUSCLE WEAKNESS OF RIGHT UPPER EXTREMITY: ICD-10-CM

## 2024-02-28 PROCEDURE — 97140 MANUAL THERAPY 1/> REGIONS: CPT | Mod: GP

## 2024-02-28 PROCEDURE — 97110 THERAPEUTIC EXERCISES: CPT | Mod: GP

## 2024-02-28 ASSESSMENT — PAIN SCALES - GENERAL: PAINLEVEL_OUTOF10: 2

## 2024-02-28 ASSESSMENT — PAIN - FUNCTIONAL ASSESSMENT: PAIN_FUNCTIONAL_ASSESSMENT: 0-10

## 2024-02-28 NOTE — PROGRESS NOTES
Physical Therapy Treatment      Patient Name: Víctor Hui  MRN: [id number]  Today's Date: 2/28/2024  Visit #9 of 24  Time Calculation  Start Time: 0930  Stop Time: 1014  Time Calculation (min): 44 min    Insurance:  Visit Limit: 30 per year    Assessment:  Pt presented with improved symptoms and greater flexion AROM. Reported feeling pinching along superior and anterior shoulder with sidelying abd to 45deg and improvement with range reduced. Shoulder hiking observed which may be contributing to sensation of pinching along the superior shoulder. Pt was able to complete single arm activities with moderate challenged. Pt is progressing well with skilled PT.     Patient's response to session: Increased knowledge and understanding    Plan:  Continue per POC. Will transition to visits every other week in March to conserve visits    Current Problem:   1. Decreased range of motion of right shoulder        2. Muscle weakness of right upper extremity        3. Right shoulder pain, unspecified chronicity              Subjective   Pt is 12 weeks and 6 days post-op R RCR with supraspinatus and infraspinatus involvement as well as biceps tenodesis on 11/30/23     Pt reports just feeling a nagging soreness upon arrival. Has been able to play with his dog without issue and has noticed improvement in motion. Continues to have trouble with sleeping. Pt was able to get his medication for his psoriatic arthritis and thinks it is starting to work again (had one week without medication).    Pain Assessment: 0-10  Pain Score: 2    Precautions  STEADI Fall Risk Score (The score of 4 or more indicates an increased risk of falling): 2    Objective   TTP RUT, pec major/ minor   Flexion 125deg    Treatments:  Therapeutic Exercise (42420): (29 min)  HEP review  UBE 5' (fwd 3', back 2')  Pulley 1'  S/L ER 2x10   S/L small range abd x5, x10  S/L flexion with use of plinth 2x10  Supine ABCs x1  Supine flexion (70-110deg) 2x10   Supine horizontal  abd (30-0) 2x10  Mid row (seated) RTB 2x10    Not performed today   Supine serratus punch x20  Wall ladder flexion x4  Resisted IR from ER (45deg abd) x10  Wand flexion 2x10   Standing IR/ ER walkouts YTB x20  Ball on wall up/down and in/out x10ea  Pertubations in supine with arm extended [id number]  Flexion small PNF 2x10 45deg to 110deg  Gentle perturbations with arm at side in static ER [id number]  Bent-over mid row 2x10  Bent-over extension to side x10    Manual Therapy (34240): (15 min)  PROM: abduction, flexion, ER  Gentle GHJ AP mobs grade I-II, II-III and oscillations for pain relief  S/L scapular mobs/ ranging  STM along R UT/ mid trap rhomboid      Neuromuscular Re-education (57952): (0 min)  Not performed today     Education and discussion on HEP and treatment regarding the benefits related to current condition, POC, pathophysiology, and precautions    Education/ HEP:  Access Code: [email or web address]  URL: [email or web address]  Date: 02/21/2024  Prepared by: Thi Ortiz    Exercises  - Supine Shoulder Flexion Extension AAROM with Dowel  - 1 x daily - 3-4 x weekly - 1-2 sets - 10 reps  - Seated Scapular Retraction  - 1 x daily - 3-4 x weekly - 1-2 sets - 10 reps  - Standing Shoulder Row with Anchored Resistance  - 1 x daily - 3-4 x weekly - 2 sets - 10 reps  - Sidelying Shoulder External Rotation Dumbbell  - 1 x daily - 3-4 x weekly - 2 sets - 10 reps  - Shoulder Internal Rotation Reactive Isometrics  - 1 x daily - 3-4 x weekly - 2 sets - 10 reps  - Shoulder External Rotation Reactive Isometrics  - 1 x daily - 3-4 x weekly - 2 sets - 10 reps  - Standing Wall Ball Circles with Mini Swiss Ball  - 1 x daily - 3-4 x weekly - 2 sets - 10 reps

## 2024-03-07 ENCOUNTER — TREATMENT (OUTPATIENT)
Dept: PHYSICAL THERAPY | Facility: CLINIC | Age: 59
End: 2024-03-07
Payer: COMMERCIAL

## 2024-03-07 DIAGNOSIS — M62.81 MUSCLE WEAKNESS OF RIGHT UPPER EXTREMITY: ICD-10-CM

## 2024-03-07 DIAGNOSIS — M25.611 DECREASED RANGE OF MOTION OF RIGHT SHOULDER: Primary | ICD-10-CM

## 2024-03-07 DIAGNOSIS — M25.511 RIGHT SHOULDER PAIN, UNSPECIFIED CHRONICITY: ICD-10-CM

## 2024-03-07 PROCEDURE — 97110 THERAPEUTIC EXERCISES: CPT | Mod: GP

## 2024-03-07 ASSESSMENT — PAIN - FUNCTIONAL ASSESSMENT: PAIN_FUNCTIONAL_ASSESSMENT: 0-10

## 2024-03-07 ASSESSMENT — PAIN SCALES - GENERAL: PAINLEVEL_OUTOF10: 1

## 2024-03-07 NOTE — PROGRESS NOTES
Physical Therapy Treatment      Patient Name: Víctor Hui  MRN: [id number]  Today's Date: 3/7/2024  Visit #10 of 24  Time Calculation  Start Time: 0758  Stop Time: 0849  Time Calculation (min): 51 min    Insurance:  Visit Limit: 30 per year    Assessment:  Pt presented with improved AROM with flexion and abduction. RUT has tendency to compensate for UE abduction/ flexion with spasm/ tightness. Pt was challenged with sidelying shoulder abduction and fatigued quickly. Tolerated supine PNF D1/ D2 in small range with min-mod difficulty. Pt is progressing well with skilled PT and was given updated HEP.     Patient's response to session: Increased knowledge and understanding    Plan:  Continue per POC. Will transition to visits every other week in March to conserve visits    Current Problem:   1. Decreased range of motion of right shoulder        2. Muscle weakness of right upper extremity        3. Right shoulder pain, unspecified chronicity              Subjective   Pt is 14 weeks and 0 days post-op R RCR with supraspinatus and infraspinatus involvement as well as biceps tenodesis on 11/30/23     Pt reports feeling like he has taken a turn for the best and is able to shampoo his hair without issue. Will continue to have familiar tweaks    Pain Assessment: 0-10  Pain Score: 1    Precautions  STEADI Fall Risk Score (The score of 4 or more indicates an increased risk of falling): 2    Objective   TTP RUT, pec major/ minor   Flexion 150deg  Abduction 135deg    Treatments:  Therapeutic Exercise (06048): (41 min)  HEP review  UBE 2' (fwd 2', back 2')  Pulley 1'  Wall ladder x5  S/L ER 1x10, 1# 1x10  S/L small range abd x10, with PT scapular assist  S/L flexion with use of plinth 2x10  Supine ABCs 1# x1  Supine D2 and D1 2x10 45deg to 110deg (light pressure 2nd set)  Hip hinge shoulder abd 2x10  Standing add RTB 2x10  Standing ext RTB 2x10  Shoulder ER/ IR RTB 2x01    Not Performed Today  Supine flexion (70-110deg) 2x10    Supine horizontal abd (30-0) 2x10  Mid row (seated) RTB 2x10  Supine serratus punch x20  Resisted IR from ER (45deg abd) x10  Wand flexion 2x10   Ball on wall up/down and in/out x10ea  Pertubations in supine with arm extended [id number]    Manual Therapy (17502): (10 min)  S/L scapular mobs/ ranging  STM along R UT/ mid trap rhomboid    Not performed today   PROM: abduction, flexion, ER  Gentle GHJ AP mobs grade I-II, II-III and oscillations for pain relief    Neuromuscular Re-education (20015): (0 min)  Not performed today     Education and discussion on HEP and treatment regarding the benefits related to current condition, POC, pathophysiology, and precautions    Education/ HEP:  Access Code: [email or web address]  URL: [email or web address]  Date: 03/07/2024  Prepared by: Thi Ortiz    Exercises  - Supine Shoulder Flexion Extension AAROM with Dowel  - 1 x daily - 3-4 x weekly - 1-2 sets - 10 reps  - Seated Scapular Retraction  - 1 x daily - 3-4 x weekly - 1-2 sets - 10 reps  - Standing Shoulder Row with Anchored Resistance  - 1 x daily - 3-4 x weekly - 2 sets - 10 reps  - Sidelying Shoulder External Rotation Dumbbell  - 1 x daily - 3-4 x weekly - 2 sets - 10 reps  - Standing Wall Ball Circles with Mini Swiss Ball  - 1 x daily - 3-4 x weekly - 2 sets - 10 reps  - Single Arm Shoulder Extension with Anchored Resistance  - 1 x daily - 3-4 x weekly - 2 sets - 10 reps  - Shoulder Adduction with Anchored Resistance  - 1 x daily - 3-4 x weekly - 2 sets - 10 reps  - Shoulder External Rotation with Anchored Resistance  - 1 x daily - 3-4 x weekly - 2 sets - 10 reps  - Standing Shoulder Internal Rotation with Anchored Resistance  - 1 x daily - 3-4 x weekly - 2 sets - 10 reps

## 2024-03-13 ENCOUNTER — TREATMENT (OUTPATIENT)
Dept: PHYSICAL THERAPY | Facility: CLINIC | Age: 59
End: 2024-03-13
Payer: COMMERCIAL

## 2024-03-13 DIAGNOSIS — M62.81 MUSCLE WEAKNESS OF RIGHT UPPER EXTREMITY: ICD-10-CM

## 2024-03-13 DIAGNOSIS — M25.611 DECREASED RANGE OF MOTION OF RIGHT SHOULDER: Primary | ICD-10-CM

## 2024-03-13 DIAGNOSIS — M25.511 RIGHT SHOULDER PAIN, UNSPECIFIED CHRONICITY: ICD-10-CM

## 2024-03-13 PROCEDURE — 97140 MANUAL THERAPY 1/> REGIONS: CPT | Mod: GP

## 2024-03-13 ASSESSMENT — PAIN SCALES - GENERAL: PAINLEVEL_OUTOF10: 3

## 2024-03-13 ASSESSMENT — PAIN - FUNCTIONAL ASSESSMENT: PAIN_FUNCTIONAL_ASSESSMENT: 0-10

## 2024-03-13 NOTE — PROGRESS NOTES
Physical Therapy Treatment      Patient Name: Víctor Hui  MRN: [id number]  Today's Date: 3/13/2024  Visit #11 of 24  Time Calculation  Start Time: 0931  Stop Time: 1012  Time Calculation (min): 41 min    Insurance:  Visit Limit: 30 per year    Assessment:  Pt presented with increased shoulder discomfort and reduced abduction AROM with pain. Muscle spasms noted in subscapularis, UT and surrounding shoulder musculature. Guarding noted during session with manual- improved towards end of session. Increased time spent on manual to address pain and range deficits. Shoulder abduction improved to 110deg at end of session.     Patient's response to session: Increased knowledge and understanding    Plan:  Continue per POC. Will transition to visits every other week in March to conserve visits    Current Problem:   1. Decreased range of motion of right shoulder        2. Muscle weakness of right upper extremity        3. Right shoulder pain, unspecified chronicity                Subjective   Pt is 14 weeks and 6 days post-op R RCR with supraspinatus and infraspinatus involvement as well as biceps tenodesis on 11/30/23     Pt reports feeling like he has good mobility, but is having increased achyness. Had soreness for a few days after his last session.     Pain Assessment: 0-10  Pain Score: 3    Precautions  STEADI Fall Risk Score (The score of 4 or more indicates an increased risk of falling): 2    Objective   TTP RUT, pec major/ minor   Flexion 150deg  Abduction 90deg, P!     Treatments:  Therapeutic Exercise (09094): (5 min)  HEP review  UBE 4' (fwd 2', back 2')    Not Performed Today  Pulley 1'  Wall ladder x5  S/L ER 1x10, 1# 1x10  S/L small range abd x10, with PT scapular assist  S/L flexion with use of plinth 2x10  Supine ABCs 1# x1  Supine D2 and D1 2x10 45deg to 110deg (light pressure 2nd set)  Hip hinge shoulder abd 2x10  Standing add RTB 2x10  Standing ext RTB 2x10  Shoulder ER/ IR RTB 2x01  Supine flexion  (70-110deg) 2x10   Supine horizontal abd (30-0) 2x10  Mid row (seated) RTB 2x10  Supine serratus punch x20  Resisted IR from ER (45deg abd) x10  Wand flexion 2x10   Ball on wall up/down and in/out x10ea  Pertubations in supine with arm extended [id number]    Manual Therapy (17154): (36 min)  S/L scapular mobs/ ranging  STM along R UT/ mid trap rhomboid, subscapularis, infraspinatus   PROM: abduction, flexion, ER  Gentle GHJ AP mobs grade I-II, II-III and oscillations for pain relief    Neuromuscular Re-education (71135): (0 min)  Not performed today     Education and discussion on HEP and treatment regarding the benefits related to current condition, POC, pathophysiology, and precautions    Education/ HEP:  Access Code: [email or web address]  URL: [email or web address]  Date: 03/07/2024  Prepared by: Thi Ortiz    Exercises  - Supine Shoulder Flexion Extension AAROM with Dowel  - 1 x daily - 3-4 x weekly - 1-2 sets - 10 reps  - Seated Scapular Retraction  - 1 x daily - 3-4 x weekly - 1-2 sets - 10 reps  - Standing Shoulder Row with Anchored Resistance  - 1 x daily - 3-4 x weekly - 2 sets - 10 reps  - Sidelying Shoulder External Rotation Dumbbell  - 1 x daily - 3-4 x weekly - 2 sets - 10 reps  - Standing Wall Ball Circles with Mini Swiss Ball  - 1 x daily - 3-4 x weekly - 2 sets - 10 reps  - Single Arm Shoulder Extension with Anchored Resistance  - 1 x daily - 3-4 x weekly - 2 sets - 10 reps  - Shoulder Adduction with Anchored Resistance  - 1 x daily - 3-4 x weekly - 2 sets - 10 reps  - Shoulder External Rotation with Anchored Resistance  - 1 x daily - 3-4 x weekly - 2 sets - 10 reps  - Standing Shoulder Internal Rotation with Anchored Resistance  - 1 x daily - 3-4 x weekly - 2 sets - 10 reps

## 2024-03-27 ENCOUNTER — TREATMENT (OUTPATIENT)
Dept: PHYSICAL THERAPY | Facility: CLINIC | Age: 59
End: 2024-03-27
Payer: COMMERCIAL

## 2024-03-27 DIAGNOSIS — M25.511 RIGHT SHOULDER PAIN, UNSPECIFIED CHRONICITY: ICD-10-CM

## 2024-03-27 DIAGNOSIS — M62.81 MUSCLE WEAKNESS OF RIGHT UPPER EXTREMITY: ICD-10-CM

## 2024-03-27 DIAGNOSIS — M25.611 DECREASED RANGE OF MOTION OF RIGHT SHOULDER: Primary | ICD-10-CM

## 2024-03-27 PROCEDURE — 97110 THERAPEUTIC EXERCISES: CPT | Mod: GP

## 2024-03-27 PROCEDURE — 97140 MANUAL THERAPY 1/> REGIONS: CPT | Mod: GP

## 2024-03-27 ASSESSMENT — PAIN - FUNCTIONAL ASSESSMENT: PAIN_FUNCTIONAL_ASSESSMENT: 0-10

## 2024-03-27 ASSESSMENT — PAIN SCALES - GENERAL: PAINLEVEL_OUTOF10: 1

## 2024-03-27 NOTE — PROGRESS NOTES
Physical Therapy Treatment      Patient Name: Víctor Hui  MRN: [id number]  Today's Date: 3/27/2024  Visit #12 of 24  Time Calculation  Start Time: 1025  Stop Time: 1106  Time Calculation (min): 41 min    Insurance:  Visit Limit: 30 per year    Assessment:  Pt presented with improved AROM into flexion and abduction without having to flex elbow to decrease lever arm. Pt has challenged with D2 AROM in supine with reported pinching at top of motion- improved after inferior glide. Able to progress therex and band resistance with mod challenge. Fatigue and muscle soreness reported at end of session.     Patient's response to session: Increased knowledge and understanding    Plan:  Continue per POC. Will transition to visits every other week in March to conserve visits    Current Problem:   1. Decreased range of motion of right shoulder        2. Muscle weakness of right upper extremity        3. Right shoulder pain, unspecified chronicity            Subjective   Pt is 16 weeks and 6 days post-op R RCR with supraspinatus and infraspinatus involvement as well as biceps tenodesis on 11/30/23     Pt reports just feeling achy upon arrival     Pain Assessment: 0-10  Pain Score: 1    Precautions  STEADI Fall Risk Score (The score of 4 or more indicates an increased risk of falling): 2    Objective     Flexion 140deg  Abduction 145    Treatments:  Therapeutic Exercise (21116): (26 min)  HEP review  Pulley 1' flexion and scaption  Supine D2 flex/ ext small range x10  S/L ER 0# x10, 1#x12  Wall ladder x5  S/L ER 1x10, 1# 1x10  S/L abd 2x10  S/L flexion 2x10  S/L horizonal abd 2x10  Mid Row GTB x20  Standing add to side GTB 2x10  Standing ext to side GTB 2x10    Not Performed Today  Supine ABCs 1# x1  Supine D2 and D1 2x10 45deg to 110deg (light pressure 2nd set)  Hip hinge shoulder abd 2x10  Shoulder ER/ IR RTB 2x01  Supine flexion (70-110deg) 2x10   Supine horizontal abd (30-0) 2x10  Supine serratus punch x20  Resisted IR from ER  (45deg abd) x10  Wand flexion 2x10   Ball on wall up/down and in/out x10ea  Pertubations in supine with arm extended [id number]    Manual Therapy (26596): (15 min)  STM along R pec major/ minor, subscapularis   PROM ER  Gentle GHJ AP mobs grade I-II, II-III and oscillations for pain relief, inferior glide grade II-III    Neuromuscular Re-education (96385): (0 min)  Not performed today     Education and discussion on HEP and treatment regarding the benefits related to current condition, POC, pathophysiology, and precautions    Education/ HEP:  Access Code: [email or web address]  URL: [email or web address]  Date: 03/07/2024  Prepared by: Thi Ortiz    Exercises  - Supine Shoulder Flexion Extension AAROM with Dowel  - 1 x daily - 3-4 x weekly - 1-2 sets - 10 reps  - Seated Scapular Retraction  - 1 x daily - 3-4 x weekly - 1-2 sets - 10 reps  - Standing Shoulder Row with Anchored Resistance  - 1 x daily - 3-4 x weekly - 2 sets - 10 reps  - Sidelying Shoulder External Rotation Dumbbell  - 1 x daily - 3-4 x weekly - 2 sets - 10 reps  - Standing Wall Ball Circles with Mini Swiss Ball  - 1 x daily - 3-4 x weekly - 2 sets - 10 reps  - Single Arm Shoulder Extension with Anchored Resistance  - 1 x daily - 3-4 x weekly - 2 sets - 10 reps  - Shoulder Adduction with Anchored Resistance  - 1 x daily - 3-4 x weekly - 2 sets - 10 reps  - Shoulder External Rotation with Anchored Resistance  - 1 x daily - 3-4 x weekly - 2 sets - 10 reps  - Standing Shoulder Internal Rotation with Anchored Resistance  - 1 x daily - 3-4 x weekly - 2 sets - 10 reps

## 2024-04-11 ENCOUNTER — TREATMENT (OUTPATIENT)
Dept: PHYSICAL THERAPY | Facility: CLINIC | Age: 59
End: 2024-04-11
Payer: COMMERCIAL

## 2024-04-11 DIAGNOSIS — M25.611 DECREASED RANGE OF MOTION OF RIGHT SHOULDER: Primary | ICD-10-CM

## 2024-04-11 DIAGNOSIS — M25.511 RIGHT SHOULDER PAIN, UNSPECIFIED CHRONICITY: ICD-10-CM

## 2024-04-11 DIAGNOSIS — M62.81 MUSCLE WEAKNESS OF RIGHT UPPER EXTREMITY: ICD-10-CM

## 2024-04-11 PROCEDURE — 97110 THERAPEUTIC EXERCISES: CPT | Mod: GP

## 2024-04-11 ASSESSMENT — PAIN SCALES - GENERAL: PAINLEVEL_OUTOF10: 1

## 2024-04-11 ASSESSMENT — PAIN - FUNCTIONAL ASSESSMENT: PAIN_FUNCTIONAL_ASSESSMENT: 0-10

## 2024-04-11 NOTE — PROGRESS NOTES
Physical Therapy Treatment      Patient Name: Víctor Hui  MRN: [id number]  Today's Date: 4/11/2024  Visit #13 of 24  Time Calculation  Start Time: 0911  Stop Time: 0946  Time Calculation (min): 35 min    Insurance:  Visit Limit: 30 per year    Assessment:  Pt is making steady improvement and was able to tolerate increased resistance/ repetition with therex. Improved control observed with D1/ D2 PNF patterns in supine. No shoulder hike with seated scaption. Pt is progressing well with skilled PT.     Patient's response to session: Increased knowledge and understanding    Plan:  Continue per POC.     Current Problem:   1. Decreased range of motion of right shoulder        2. Muscle weakness of right upper extremity        3. Right shoulder pain, unspecified chronicity              Subjective   Pt is 19 weeks and 0 days post-op R RCR with supraspinatus and infraspinatus involvement as well as biceps tenodesis on 11/30/23     Pt reports doing well- just has his familiar achy. Pt needs to leave early for another appt    Pain Assessment: 0-10  Pain Score: 1    Precautions  STEADI Fall Risk Score (The score of 4 or more indicates an increased risk of falling): 2    Objective     Flexion 145deg  Abduction 145deg    Treatments:  Therapeutic Exercise (85627): (35 min)  HEP review  UBE 3'fwd, 1'  Chest press 1# 2x10  Pulley 1' flexion and scaption  Supine D1/ D2 PNF flex/ ext small range 2x10  S/L ER 1# 2x10  S/L abd 2x10  S/L flexion 2x10  Seated scaption 1# 2x10  Seated ER 2# 2x10  Cable column mid row 5# x20  Cable column punchout 2.5# x10    Not performed today   S/L horizonal abd 2x10  Mid Row GTB x20  Standing add to side GTB 2x10  Standing ext to side GTB 2x10    Manual Therapy (85696): (0 min)  Not performed today   STM along R pec major/ minor, subscapularis   PROM ER  Gentle GHJ AP mobs grade I-II, II-III and oscillations for pain relief, inferior glide grade II-III    Neuromuscular Re-education (13744): (0  min)  Not performed today     Education and discussion on HEP and treatment regarding the benefits related to current condition, POC, pathophysiology, and precautions    Education/ HEP:  Access Code: [email or web address]  URL: [email or web address]  Date: 03/07/2024  Prepared by: Thi Ortiz    Exercises  - Supine Shoulder Flexion Extension AAROM with Dowel  - 1 x daily - 3-4 x weekly - 1-2 sets - 10 reps  - Seated Scapular Retraction  - 1 x daily - 3-4 x weekly - 1-2 sets - 10 reps  - Standing Shoulder Row with Anchored Resistance  - 1 x daily - 3-4 x weekly - 2 sets - 10 reps  - Sidelying Shoulder External Rotation Dumbbell  - 1 x daily - 3-4 x weekly - 2 sets - 10 reps  - Standing Wall Ball Circles with Mini Swiss Ball  - 1 x daily - 3-4 x weekly - 2 sets - 10 reps  - Single Arm Shoulder Extension with Anchored Resistance  - 1 x daily - 3-4 x weekly - 2 sets - 10 reps  - Shoulder Adduction with Anchored Resistance  - 1 x daily - 3-4 x weekly - 2 sets - 10 reps  - Shoulder External Rotation with Anchored Resistance  - 1 x daily - 3-4 x weekly - 2 sets - 10 reps  - Standing Shoulder Internal Rotation with Anchored Resistance  - 1 x daily - 3-4 x weekly - 2 sets - 10 reps

## 2024-04-24 ENCOUNTER — APPOINTMENT (OUTPATIENT)
Dept: PHYSICAL THERAPY | Facility: CLINIC | Age: 59
End: 2024-04-24
Payer: COMMERCIAL

## 2024-05-07 PROCEDURE — RXMED WILLOW AMBULATORY MEDICATION CHARGE

## 2024-05-08 ENCOUNTER — TREATMENT (OUTPATIENT)
Dept: PHYSICAL THERAPY | Facility: CLINIC | Age: 59
End: 2024-05-08
Payer: COMMERCIAL

## 2024-05-08 ENCOUNTER — PHARMACY VISIT (OUTPATIENT)
Dept: PHARMACY | Facility: CLINIC | Age: 59
End: 2024-05-08
Payer: COMMERCIAL

## 2024-05-08 DIAGNOSIS — M25.511 RIGHT SHOULDER PAIN, UNSPECIFIED CHRONICITY: ICD-10-CM

## 2024-05-08 DIAGNOSIS — M25.611 DECREASED RANGE OF MOTION OF RIGHT SHOULDER: Primary | ICD-10-CM

## 2024-05-08 DIAGNOSIS — M62.81 MUSCLE WEAKNESS OF RIGHT UPPER EXTREMITY: ICD-10-CM

## 2024-05-08 PROCEDURE — 97110 THERAPEUTIC EXERCISES: CPT | Mod: GP

## 2024-05-08 ASSESSMENT — PAIN SCALES - GENERAL: PAINLEVEL_OUTOF10: 0 - NO PAIN

## 2024-05-08 ASSESSMENT — PAIN - FUNCTIONAL ASSESSMENT: PAIN_FUNCTIONAL_ASSESSMENT: 0-10

## 2024-05-08 NOTE — PROGRESS NOTES
Physical Therapy Treatment      Patient Name: Víctor Hui  MRN: [id number]  Today's Date: 5/8/2024  Visit #14 of 24  Time Calculation  Start Time: 0932  Stop Time: 1012  Time Calculation (min): 40 min    Insurance:  Visit Limit: 30 per year    Assessment:  Pt has made progress towards his goals with improvements in ROM and strength. Pt is challenged most with flexion and ER strength. He had significant improvement on his QuickDASH. Pt to have follow-up with ortho next week.    Patient's response to session: Increased knowledge and understanding    Plan:  Continue per POC and per MD recommendation     Current Problem:   1. Decreased range of motion of right shoulder        2. Muscle weakness of right upper extremity        3. Right shoulder pain, unspecified chronicity            Subjective   Pt is 22 weeks and 6 days post-op R RCR with supraspinatus and infraspinatus involvement as well as biceps tenodesis on 11/30/23     Pt reports doing well and has noticed improvement with pain and ROM. Has been sleeping better, but not great. Will get a sharp pain with quick mov'ts. Was able to do some light yard work without issue. Pt to see MD next Tuesday.     Pain Assessment: 0-10  Pain Score: 0 - No pain    Precautions  STEADI Fall Risk Score (The score of 4 or more indicates an increased risk of falling): 2    Objective   ROM  Flexion R/L: 147deg, pain right before end range/ 147deg  Abduction 148deg/ 160deg  Supine 90deg abd IR: R: 25  L: 20deg  Supine 90deg abd ER: R:70  L: 70    Strength with HHD  Seated ER R/L  8#/ 20# (40%)   Seated IR R/L 28#/29#  Seated arm at side Abduction  R/L 30#, P!/ 31#  Supine Flexion R/L  21#/31# (67%)    Treatments:  Therapeutic Exercise (95662): (40 min)  HEP review  UBE 3'fwd, 1'  Reassessment/ Goal Update  Seated ER 3# 3x10  Seated flexion 1# 2x10  YTB banded modified flexion with ER hold 3x5  SA mid row GTB  Chest press 1# 2x10  Pulley 1' flexion and scaption  Supine D1/ D2 PNF flex/  ext small range 2x10  S/L ER 1# 2x10  S/L abd 2x10  S/L flexion 2x10  Seated scaption 1# 2x10  Seated ER 2# 2x10  Cable column mid row 5# x20  Cable column punchout 2.5# x10    Not performed today   S/L horizonal abd 2x10  Mid Row GTB x20  Standing add to side GTB 2x10  Standing ext to side GTB 2x10    Manual Therapy (19390): (0 min)  Not performed today   STM along R pec major/ minor, subscapularis   PROM ER  Gentle GHJ AP mobs grade I-II, II-III and oscillations for pain relief, inferior glide grade II-III    Neuromuscular Re-education (06204): (0 min)  Not performed today     Education and discussion on HEP and treatment regarding the benefits related to current condition, POC, pathophysiology, and precautions    Goals:  Patient will improve quickDASH score to meet minimal detectable change of improvement to improve performance of ADLs. Met     Patient will be independent with home exercise program for proper self-management of condition. Met     Patient will improve active range of motion in deficit areas for ADL completion. Met     Patient will improve strength in deficit areas so patient can work with less pain. Partially Met     Patient will do housework without pain. Partially Met    Education/ HEP:  Access Code: [email or web address]  URL: [email or web address]  Date: 03/07/2024  Prepared by: Thi Ortiz    Exercises  - Supine Shoulder Flexion Extension AAROM with Dowel  - 1 x daily - 3-4 x weekly - 1-2 sets - 10 reps  - Seated Scapular Retraction  - 1 x daily - 3-4 x weekly - 1-2 sets - 10 reps  - Standing Shoulder Row with Anchored Resistance  - 1 x daily - 3-4 x weekly - 2 sets - 10 reps  - Sidelying Shoulder External Rotation Dumbbell  - 1 x daily - 3-4 x weekly - 2 sets - 10 reps  - Standing Wall Ball Circles with Mini Swiss Ball  - 1 x daily - 3-4 x weekly - 2 sets - 10 reps  - Single Arm Shoulder Extension with Anchored Resistance  - 1 x daily - 3-4 x weekly - 2 sets - 10 reps  -  Shoulder Adduction with Anchored Resistance  - 1 x daily - 3-4 x weekly - 2 sets - 10 reps  - Shoulder External Rotation with Anchored Resistance  - 1 x daily - 3-4 x weekly - 2 sets - 10 reps  - Standing Shoulder Internal Rotation with Anchored Resistance  - 1 x daily - 3-4 x weekly - 2 sets - 10 reps

## 2024-05-14 ENCOUNTER — OFFICE VISIT (OUTPATIENT)
Dept: ORTHOPEDIC SURGERY | Facility: CLINIC | Age: 59
End: 2024-05-14
Payer: COMMERCIAL

## 2024-05-14 DIAGNOSIS — S46.011A TRAUMATIC TEAR OF RIGHT ROTATOR CUFF, UNSPECIFIED TEAR EXTENT, INITIAL ENCOUNTER: Primary | ICD-10-CM

## 2024-05-14 PROCEDURE — 1036F TOBACCO NON-USER: CPT | Performed by: STUDENT IN AN ORGANIZED HEALTH CARE EDUCATION/TRAINING PROGRAM

## 2024-05-14 PROCEDURE — 99212 OFFICE O/P EST SF 10 MIN: CPT | Performed by: STUDENT IN AN ORGANIZED HEALTH CARE EDUCATION/TRAINING PROGRAM

## 2024-05-14 NOTE — PROGRESS NOTES
History: Patient returns to the office status post R RCR and BT on 6 months ago. Patient has been working with therapy and pain is well controlled. Denies numbness/tingling. Feeling better.      Past medical history, past surgical history, medications, allergies, family history, social history, and review of systems were reviewed today and have been documented separately in this encounter.   A 12 point review of systems was negative other than as stated in the HPI.     Physical Examination:     On exam of the right upper extremity, incisions are well healed, without significant erythema or drainage. Demonstrates full ROM of the elbow/wrist/hand. Neurovascularly intact throughout. AROM of the shoulder today to , ER 40, IR T12. Full strength.       Assessment: 6 months s/p R RCR and BT      Plan: Patient progressing as expected after recent surgery. Doing well. Can resume all activities gradually. All questions answered.      Dragon software was used to dictate this note, please be aware that minor errors in transcription may be present.     Ethan Mueller MD    Shoulder/Elbow Surgery  [facility]

## 2024-05-23 ENCOUNTER — APPOINTMENT (OUTPATIENT)
Dept: PHYSICAL THERAPY | Facility: CLINIC | Age: 59
End: 2024-05-23
Payer: COMMERCIAL

## 2024-08-19 PROCEDURE — RXMED WILLOW AMBULATORY MEDICATION CHARGE

## 2024-08-21 ENCOUNTER — SPECIALTY PHARMACY (OUTPATIENT)
Dept: PHARMACY | Facility: CLINIC | Age: 59
End: 2024-08-21

## 2024-08-23 ENCOUNTER — PHARMACY VISIT (OUTPATIENT)
Dept: PHARMACY | Facility: CLINIC | Age: 59
End: 2024-08-23
Payer: COMMERCIAL

## 2024-09-23 ENCOUNTER — LAB (OUTPATIENT)
Dept: LAB | Facility: LAB | Age: 59
End: 2024-09-23
Payer: COMMERCIAL

## 2024-09-23 DIAGNOSIS — R69 DIAGNOSIS UNKNOWN: Primary | ICD-10-CM

## 2024-09-23 LAB — COTININE UR QL SCN: NEGATIVE

## 2024-09-23 PROCEDURE — 86481 TB AG RESPONSE T-CELL SUSP: CPT

## 2024-09-23 PROCEDURE — 36415 COLL VENOUS BLD VENIPUNCTURE: CPT

## 2024-11-06 PROCEDURE — RXMED WILLOW AMBULATORY MEDICATION CHARGE

## 2024-11-07 ENCOUNTER — SPECIALTY PHARMACY (OUTPATIENT)
Dept: PHARMACY | Facility: CLINIC | Age: 59
End: 2024-11-07

## 2024-11-08 ENCOUNTER — PHARMACY VISIT (OUTPATIENT)
Dept: PHARMACY | Facility: CLINIC | Age: 59
End: 2024-11-08
Payer: COMMERCIAL

## 2025-02-03 PROCEDURE — RXMED WILLOW AMBULATORY MEDICATION CHARGE

## 2025-02-04 ENCOUNTER — SPECIALTY PHARMACY (OUTPATIENT)
Dept: PHARMACY | Facility: CLINIC | Age: 60
End: 2025-02-04

## 2025-02-05 ENCOUNTER — PHARMACY VISIT (OUTPATIENT)
Dept: PHARMACY | Facility: CLINIC | Age: 60
End: 2025-02-05
Payer: COMMERCIAL

## 2025-02-24 ENCOUNTER — SPECIALTY PHARMACY (OUTPATIENT)
Dept: PHARMACY | Facility: CLINIC | Age: 60
End: 2025-02-24

## 2025-02-25 ENCOUNTER — PHARMACY VISIT (OUTPATIENT)
Dept: PHARMACY | Facility: CLINIC | Age: 60
End: 2025-02-25
Payer: COMMERCIAL

## 2025-02-25 ENCOUNTER — SPECIALTY PHARMACY (OUTPATIENT)
Dept: PHARMACY | Facility: CLINIC | Age: 60
End: 2025-02-25

## 2025-02-25 PROCEDURE — RXMED WILLOW AMBULATORY MEDICATION CHARGE

## 2025-02-28 ENCOUNTER — SPECIALTY PHARMACY (OUTPATIENT)
Dept: PHARMACY | Facility: CLINIC | Age: 60
End: 2025-02-28

## 2025-03-24 ENCOUNTER — SPECIALTY PHARMACY (OUTPATIENT)
Dept: PHARMACY | Facility: CLINIC | Age: 60
End: 2025-03-24

## 2025-03-25 ENCOUNTER — SPECIALTY PHARMACY (OUTPATIENT)
Dept: PHARMACY | Facility: CLINIC | Age: 60
End: 2025-03-25

## 2025-04-21 ENCOUNTER — SPECIALTY PHARMACY (OUTPATIENT)
Dept: PHARMACY | Facility: CLINIC | Age: 60
End: 2025-04-21

## 2025-04-21 PROCEDURE — RXMED WILLOW AMBULATORY MEDICATION CHARGE

## 2025-04-24 ENCOUNTER — PHARMACY VISIT (OUTPATIENT)
Dept: PHARMACY | Facility: CLINIC | Age: 60
End: 2025-04-24
Payer: COMMERCIAL

## 2025-05-09 PROCEDURE — RXMED WILLOW AMBULATORY MEDICATION CHARGE

## 2025-05-13 ENCOUNTER — PHARMACY VISIT (OUTPATIENT)
Dept: PHARMACY | Facility: CLINIC | Age: 60
End: 2025-05-13
Payer: COMMERCIAL

## 2025-05-19 PROCEDURE — RXMED WILLOW AMBULATORY MEDICATION CHARGE

## 2025-05-22 ENCOUNTER — SPECIALTY PHARMACY (OUTPATIENT)
Dept: PHARMACY | Facility: CLINIC | Age: 60
End: 2025-05-22

## 2025-05-22 NOTE — PROGRESS NOTES
"[facility] Clinical Note  Patient Reassessment     Introduction  Víctor Hui is a 60 y.o. male who is on the specialty pharmacy service for management of: Dermatology Core.      [facility] supplied medication: ixekizumab (Taltz Autoinjector) 80 mg/mL injection   Inject 80mg SQ aat week 12        Duration of therapy: Maintenance    The most recent encounter visit with the referring prescriber CASSIDY GOFF MD.  Pharmacy will continue to collaborate in the care of this patient with the referring prescriber.    Discussion  Víctor was contacted on 5/22/2025 at 4:31 PM for a pharmacy visit with encounter number [id number] from:   [facility]  [facility] [facility] [address]  Dept: [phone]  Dept Fax: [phone]  Víctor consented to a/an Telephone visit, which was performed.    Efficacy  Patient has developed new symptoms of condition: No  Patient/caregiver feels medication is affecting the disease state: Patient reports improvement in skin since starting the Taltz. He reports that his skin is clear and denies any recent flares. He notes he still has his arthritis symptoms and states that Taltz hasn't been as as effective on his arthritis like his psoriasis symptoms.    Goals  Provided education on goals and possible outcomes of therapy:  Adherence with therapy  Timely completion of appropriate labs  Timely and appropriate follow up with provider  Identify and address medication interactions with presciption medications, OTC medications and supplements  Optimize or maintain quality of life  Dermatology: Prevent or reduce disease flares  Reduce use of topical agents (corticosteroids or other \"prn\" agents)  Reduction of plaque size, thickness and body surface area (PSO)  Patient has documented target(s) for goals of therapy: Yes    Targets       Target Due Completed Completed By Outcome Source     Goal: Prevent and reduce disease flares 9/22/2025 -- -- " "-- --         Goal: Reduce use of ancillary or \"prn\" medications 9/22/2025 -- -- -- --         Goal: Prevent and reduce disease flares 9/22/2025 5/22/2025 Colette Johnson PharmD On Track --    Pt denies flares. Skin clear       Goal: Reduce use of ancillary or \"prn\" medications 9/22/2025 5/22/2025 Colette Johnson PharmD On Track --    Pt denies recent use of topical agents              Tolerance  Patient has experienced side effects from this medication: No  Changes to current therapy regimen: No    The follow-up timeline was discussed. Every person responds to and reacts to therapy differently. Patient should be assessed for efficacy and tolerability in approximately: other - 4 months            Adherence  Patient Information  Informant: Self (Patient)  Demonstrates Understanding of Importance of Adherence: Yes  Does the patient have any barriers to self-administration (including physical and mental?): No  Support Network for Adherence: Healthcare Provider  Adherence Tools Used: Calendar  Medication Information  Medication: ixekizumab (Taltz Autoinjector)  Patient Reported Missed Doses in the Last 4 Weeks: 0  Estimated Medication Adherence Level: Good  Adherence Estimation Source: Claims history  Barriers to Adherence: No Problems identified   The importance of adherence was discussed and patient/caregiver was advised to take the medication as prescribed by their provider. Encouraged patient/caregiver to call physician's office or specialty pharmacy if they have a question regarding a missed dose.    General Assessment  Changes to home medications, OTCs or supplements: No - taking APAP for arthritis  Current Medications[1]  Reported new allergies: No  Reported new medical conditions: No  Additional monitoring reviewed: Dermatology- For Biologics- TB:   Lab Results   Component Value Date    TBSIN Negative 09/23/2024     Is laboratory follow up needed? No - but recommended following- up with dermatologist - pt " "agreed.    Advised to contact the pharmacy if there are any changes to the patient's medication list, including prescriptions, OTC medications, herbal products, or supplements.    Impression/Plan  This patient has not been identified as high risk due to Lack of high risk qualifiers.  The following action was taken:N/A          QOL/Patient Satisfaction  Rate your quality of life on scale of 1-10: 8 (Patient stated 10 at first and then it sounded that he changed to \"8\". A little difficult to understand him at times.)  Rate your satisfaction with  Specialty Pharmacy on scale of 1-10: 9    Provided contact information ([phone]) for [facility] and reviewed dispensing process, refill timeline and patient management follow up. Confirmed understanding of education conducted during assessment. All questions and concerns were addressed and patient/caregiver was encouraged to reach out for additional questions or concerns.    Based on the patient's diagnosis, medication list, progress towards goals, adherence, tolerance, and medication list, medication remains appropriate: Therapy remains appropriate (I attest)    Colette Johnson, PharmD       [1]   Current Outpatient Medications   Medication Sig Dispense Refill    ixekizumab (Taltz Autoinjector) 80 mg/mL injection Inject 80mg SQ every 4 weeks- maintenance dose 3 mL 2    ixekizumab (Taltz Autoinjector) 80 mg/mL injection Inject 80mg SQ aat week 12 1 mL 0    ixekizumab (Taltz Autoinjector, 2 Pack,) 80 mg/mL injection Inject 80mg (1 pen) under the skin every 2 weeks ( for weeks 4-10) 2 mL 1    ixekizumab (Taltz Autoinjector, 3 Pack,) 80 mg/mL injection Inject 160mg (2 pens) beneath the skin on day 1 then 80mg (1 pen) 2 weeks later. 3 mL 0    lisinopriL-hydrochlorothiazide 10-12.5 mg tablet Take 1 tablet by mouth once daily in the morning.      metoprolol succinate XL (Toprol-XL) 25 mg 24 hr tablet Take 1 tablet (25 mg) by mouth once daily in the " morning.      mupirocin (Bactroban) 2 % ointment Apply topically 3 times a day. 22 g 1    risankizumab-rzaa (Skyrizi) 150 mg/mL pen injector pen Inject 150mg (1 pen) beneath the skin once every 12 weeks. 1 mL 3     No current facility-administered medications for this visit.

## 2025-05-23 ENCOUNTER — SPECIALTY PHARMACY (OUTPATIENT)
Dept: PHARMACY | Facility: CLINIC | Age: 60
End: 2025-05-23

## 2025-05-27 ENCOUNTER — PHARMACY VISIT (OUTPATIENT)
Dept: PHARMACY | Facility: CLINIC | Age: 60
End: 2025-05-27
Payer: COMMERCIAL

## 2025-06-04 ENCOUNTER — SPECIALTY PHARMACY (OUTPATIENT)
Dept: PHARMACY | Facility: CLINIC | Age: 60
End: 2025-06-04

## 2025-06-10 ENCOUNTER — APPOINTMENT (OUTPATIENT)
Dept: OPHTHALMOLOGY | Facility: CLINIC | Age: 60
End: 2025-06-10
Payer: COMMERCIAL

## 2025-06-10 DIAGNOSIS — H25.813 COMBINED FORMS OF AGE-RELATED CATARACT OF BOTH EYES: ICD-10-CM

## 2025-06-10 DIAGNOSIS — H53.8 BLURRED VISION: Primary | ICD-10-CM

## 2025-06-10 DIAGNOSIS — H52.223 REGULAR ASTIGMATISM OF BOTH EYES: ICD-10-CM

## 2025-06-10 DIAGNOSIS — H52.13 MYOPIA, BILATERAL: ICD-10-CM

## 2025-06-10 DIAGNOSIS — H52.4 PRESBYOPIA: ICD-10-CM

## 2025-06-10 PROCEDURE — 92015 DETERMINE REFRACTIVE STATE: CPT | Performed by: OPHTHALMOLOGY

## 2025-06-10 PROCEDURE — 92004 COMPRE OPH EXAM NEW PT 1/>: CPT | Performed by: OPHTHALMOLOGY

## 2025-06-10 ASSESSMENT — REFRACTION_MANIFEST
OS_CYLINDER: -1.00
OD_ADD: +2.75
OS_ADD: +2.75
OD_SPHERE: -1.25
OD_CYLINDER: -1.00
OD_AXIS: 130
OS_AXIS: 005
OS_SPHERE: -2.25

## 2025-06-10 ASSESSMENT — TONOMETRY
IOP_METHOD: GOLDMANN APPLANATION
OD_IOP_MMHG: 16
OS_IOP_MMHG: 14

## 2025-06-10 ASSESSMENT — CUP TO DISC RATIO
OD_RATIO: 0.3
OS_RATIO: 0.3

## 2025-06-10 ASSESSMENT — VISUAL ACUITY
OS_BAT_MED: 20/50
OS_CC: 20/50+1
OD_CC: 20/50
METHOD: SNELLEN - LINEAR
OD_BAT_MED: 20/40

## 2025-06-10 ASSESSMENT — REFRACTION_WEARINGRX
OD_AXIS: 137
OD_CYLINDER: -1.00
OD_SPHERE: -2.00
OS_SPHERE: -2.25
SPECS_TYPE: SINGLE

## 2025-06-10 ASSESSMENT — ENCOUNTER SYMPTOMS: EYES NEGATIVE: 1

## 2025-06-10 ASSESSMENT — EXTERNAL EXAM - LEFT EYE: OS_EXAM: NORMAL

## 2025-06-10 ASSESSMENT — SLIT LAMP EXAM - LIDS
COMMENTS: NORMAL
COMMENTS: NORMAL

## 2025-06-10 ASSESSMENT — EXTERNAL EXAM - RIGHT EYE: OD_EXAM: NORMAL

## 2025-06-10 NOTE — PROGRESS NOTES
Assessment/Plan   Diagnoses and all orders for this visit:  Blurred vision  Improved with refraction modestly    Myopia, bilateral  Regular astigmatism of both eyes  Presbyopia  Refractive error  -give Rx for new glasses- given at patient's request    Combined forms of age-related cataract of both eyes  -cataract surgery discussed. Pt wants to hold off    Return for a dilated exam in  12  months or sooner if having any problems

## 2025-06-19 PROCEDURE — RXMED WILLOW AMBULATORY MEDICATION CHARGE

## 2025-06-25 ENCOUNTER — PHARMACY VISIT (OUTPATIENT)
Dept: PHARMACY | Facility: CLINIC | Age: 60
End: 2025-06-25
Payer: COMMERCIAL

## 2025-07-16 PROCEDURE — RXMED WILLOW AMBULATORY MEDICATION CHARGE

## 2025-07-21 ENCOUNTER — SPECIALTY PHARMACY (OUTPATIENT)
Dept: PHARMACY | Facility: CLINIC | Age: 60
End: 2025-07-21

## 2025-07-21 ENCOUNTER — PHARMACY VISIT (OUTPATIENT)
Dept: PHARMACY | Facility: CLINIC | Age: 60
End: 2025-07-21
Payer: COMMERCIAL

## 2025-07-21 RX ORDER — RISANKIZUMAB-RZAA 150 MG/ML
INJECTION SUBCUTANEOUS
Qty: 1 ML | Refills: 3 | Status: CANCELLED | OUTPATIENT
Start: 2025-07-21

## 2025-07-24 ENCOUNTER — SPECIALTY PHARMACY (OUTPATIENT)
Dept: PHARMACY | Facility: CLINIC | Age: 60
End: 2025-07-24

## 2025-07-29 ENCOUNTER — SPECIALTY PHARMACY (OUTPATIENT)
Dept: PHARMACY | Facility: CLINIC | Age: 60
End: 2025-07-29

## 2025-08-05 ENCOUNTER — APPOINTMENT (OUTPATIENT)
Dept: OPHTHALMOLOGY | Facility: CLINIC | Age: 60
End: 2025-08-05
Payer: COMMERCIAL

## 2025-08-13 ENCOUNTER — SPECIALTY PHARMACY (OUTPATIENT)
Dept: PHARMACY | Facility: CLINIC | Age: 60
End: 2025-08-13

## 2025-08-15 PROCEDURE — RXMED WILLOW AMBULATORY MEDICATION CHARGE

## 2025-08-19 ENCOUNTER — PHARMACY VISIT (OUTPATIENT)
Dept: PHARMACY | Facility: CLINIC | Age: 60
End: 2025-08-19
Payer: COMMERCIAL

## 2026-06-16 ENCOUNTER — APPOINTMENT (OUTPATIENT)
Dept: OPHTHALMOLOGY | Facility: CLINIC | Age: 61
End: 2026-06-16
Payer: COMMERCIAL

## (undated) DEVICE — CANNULA, TRIPLE-DAM TWIST-IN, 7MM X 7CM, VALVED

## (undated) DEVICE — CANNULA, ARTHROSCOPIC CRYSTAL 5.75MM

## (undated) DEVICE — Device

## (undated) DEVICE — GLOVE, SURGICAL, PROTEXIS PI , 7.0, PF, LF

## (undated) DEVICE — GLOVE, SURGICAL, PROTEXIS PI BLUE W/NEUTHERA, 7.5, PF, LF

## (undated) DEVICE — TUBING, PUMP MAIN 16FT STERILE

## (undated) DEVICE — SPONGE, GAUZE, AVANT, STERILE, NONWOVEN, 4PLY, 4 X 4, STANDARD

## (undated) DEVICE — BLANKET, LOWER BODY, VHA PLUS, ADULT

## (undated) DEVICE — ADULT REM POLYHESIVE II PATIENT RETURN ELECTRODE W/9 FT (2.7 M) ATTACHED CORD

## (undated) DEVICE — DRAPE, SHEET, U, STERI DRAPE, 47 X 51 IN, DISPOSABLE, STERILE

## (undated) DEVICE — EXCAL 4MM X 13CM SINGLE

## (undated) DEVICE — DRESSING, NON-ADHERENT, 3 X 3 IN, STERILE

## (undated) DEVICE — DRESSING, ABDOMINAL, TENDERSORB, 8 X 7-1/2 IN, STERILE

## (undated) DEVICE — PENCIL, ELECTROSURG, W/BUTTON SWITCH & HOLSTER, EZ CLEAN, DISP

## (undated) DEVICE — MASK, FACE, TENET, FOAM POSITIONING, DISPOSABLE

## (undated) DEVICE — HD SCORPION NEEDLE, W2ITH MEGA LOADER

## (undated) DEVICE — DRAPE, SHEET, 17 X 23 IN

## (undated) DEVICE — GOWN, ASTOUND, XL